# Patient Record
Sex: MALE | Race: BLACK OR AFRICAN AMERICAN | NOT HISPANIC OR LATINO | Employment: OTHER | URBAN - METROPOLITAN AREA
[De-identification: names, ages, dates, MRNs, and addresses within clinical notes are randomized per-mention and may not be internally consistent; named-entity substitution may affect disease eponyms.]

---

## 2020-12-10 ENCOUNTER — HISTORICAL (OUTPATIENT)
Dept: ADMINISTRATIVE | Facility: HOSPITAL | Age: 45
End: 2020-12-10

## 2020-12-10 LAB — SARS-COV-2 RNA RESP QL NAA+PROBE: NOT DETECTED

## 2021-12-11 ENCOUNTER — HISTORICAL (OUTPATIENT)
Dept: ADMINISTRATIVE | Facility: HOSPITAL | Age: 46
End: 2021-12-11

## 2021-12-11 LAB
HAV IGM SERPL QL IA: NONREACTIVE
HBV CORE IGM SERPL QL IA: NONREACTIVE
HBV SURFACE AG SERPL QL IA: NONREACTIVE
HCV AB SERPL QL IA: NONREACTIVE
HIV 1+2 AB+HIV1 P24 AG SERPL QL IA: NONREACTIVE
RPR SER QL: REACTIVE
T PALLIDUM AB SER QL: REACTIVE

## 2022-04-10 ENCOUNTER — HISTORICAL (OUTPATIENT)
Dept: ADMINISTRATIVE | Facility: HOSPITAL | Age: 47
End: 2022-04-10

## 2022-04-27 VITALS
DIASTOLIC BLOOD PRESSURE: 80 MMHG | HEIGHT: 65 IN | WEIGHT: 183 LBS | SYSTOLIC BLOOD PRESSURE: 124 MMHG | BODY MASS INDEX: 30.49 KG/M2 | OXYGEN SATURATION: 95 %

## 2022-05-03 NOTE — HISTORICAL [facility]
This is a historical note converted from [employer]. Formatting and pictures may have been removed.  Please reference [employer] for original formatting and attached multimedia. Chief Complaint  STD testing urine and Blood, exposure to syphillis  History of Present Illness  Patient presents to Urgent Care for stated complaint during Covid health crisis.  46-year-old male presented to clinic requesting STD testing?blood and urine?as requested by the plasma center?before donating, denies any history?of STIs, denies any fever or nausea vomiting or diarrhea?denies any urinary issues or?penile?discharge?or testicular pain  Review of Systems  Constitutional: negative except as stated in HPI  Eye: negative except as stated in HPI  ENT: negative except as stated in HPI  Respiratory: negative except as stated in HPI  Cardiovascular: negative except as stated in HPI  Gastrointestinal: negative except as stated in HPI  Genitourinary: negative except as stated in HPI  Hema/Lymph: negative except as stated in HPI  Endocrine: negative except as stated in HPI  Immunologic: negative except as stated in HPI  Musculoskeletal: negative except as stated in HPI  Integumentary: negative except as stated in HPI  Neurologic: negative except as stated in HPI  Physical Exam  Vitals & Measurements  T:?37.0? ?C (Oral)? HR:?71(Peripheral)? RR:?18? BP:?124/80? SpO2:?95%?  HT:?165.00?cm? WT:?83.000?kg? BMI:?30.49?  Vital signs: Reviewed  General: in no apparent distress, appropriate for age  Head: no signs of head trauma  Eyes: pupils are reactive. ?Extraocular motions intact, conjunctival pink.?  ENT: Bilateral ear canal clear, no edema, no discharge or bleeding. ?Bilateral TMs intact, pearly gray, no effusion, no retraction or perforation or bulging.  Nose: no sinus tenderness, nasal turbinate normal no erythema, clear nasal discharge.  Mouth: posterior pharynx-clear, uvula midline.  Neck :no adenopathy, supple, full range of motion,  nontender  Respiratory: clear breath sounds bilaterally. ?No wheezing  Cardiac :regular, no murmur  Gastrointestinal: Soft, nontender, no CVA tenderness  :?Deferred, please see HPI  Musculoskeletal: Moves all extremities, ambulatory without assistant  Integumentary: No rashes or skin lesions visible  Neurologic: Cranial nerves grossly intact, no signs of peripheral neurological deficit  Assessment/Plan  1.?Encounter for assessment of STD exposure?Z76.89  Discussed physical exam findings, instructed patient?will notify of positive, continue to practice safe sex?use condoms?refrain from any sexual activities?or donate plasma?until all your blood work resulted  Instructed patient to notify his primary care provider regarding the visit today and schedule follow-up appointment in 2 to 3 days if needed  Instructed patient to come back to clinic or go to emergency room department if symptom worsens or no improvement or for any other reasons  Questions elicited and answered  Patient verbalized understanding of discharge instructions, will read discharge education instructions  Ordered:  Bandar Avalos, Trich vag [id number], Now collect, Urine, 12/11/21 11:53:00 CST, Stop date 12/11/21 11:53:00 CST, Nurse collect, Encounter for assessment of STD exposure, 12/11/21 11:53:00 CST  Hepatitis Panel-, Stat collect, 12/11/21 11:52:00 CST, Blood, Stop date 12/11/21 11:53:00 CST, Nurse collect, Encounter for assessment of STD exposure, 12/11/21 11:52:00 CST  HIV 1 and 2, Stat collect, 12/11/21 11:52:00 CST, Blood, Stop date 12/11/21 11:53:00 CST, Nurse collect, Encounter for assessment of STD exposure, 12/11/21 11:52:00 CST  Office/Outpatient Visit Level 4 New [id number] , Encounter for assessment of STD exposure, 12/11/21 12:00:00 CST  Syphilis Antibody (with Reflex RPR), Stat collect, 12/11/21 11:52:00 CST, Blood, Stop date 12/11/21 11:53:00 CST, Nurse collect, Encounter for assessment of STD exposure, 12/11/21 11:52:00  CST  ?   Problem List/Past Medical History  Ongoing  Obesity  Tobacco user  Tobacco user  Historical  No qualifying data  Procedure/Surgical History  Appendectomy;   Medications  No active medications  Allergies  No Known Medication Allergies  Social History  Abuse/Neglect  No, No, Yes, 12/11/2021  No, 06/04/2021  No, 03/03/2021  Alcohol  Current, 1-2 times per year, 02/07/2019  Past, 07/28/2017  Substance Use  Never, 02/07/2019  Never, 07/28/2017  Tobacco  10 or more cigarettes (1/2 pack or more)/day in last 30 days, N/A, 12/11/2021  10 or more cigarettes (1/2 pack or more)/day in last 30 days, No, 06/04/2021  10 or more cigarettes (1/2 pack or more)/day in last 30 days, No, 03/03/2021  Immunizations  Vaccine Date Status   tetanus/diphtheria/pertussis, acel(Tdap) 06/26/2019 Given   tetanus/diphtheria/pertussis, acel(Tdap) 01/29/2017 Given   Health Maintenance  Health Maintenance  ???Pending?(in the next year)  ??? ??Due?  ??? ? ? ?Aspirin Therapy for CVD Prevention due??12/11/21??and every 1??year(s)  ??? ??Refused?  ??? ? ? ?Smoking Cessation due??01/01/21??and every 1??year(s)  ??? ??Due In Future?  ??? ? ? ?Obesity Screening not due until??01/01/22??and every 1??year(s)  ??? ? ? ?Alcohol Misuse Screening not due until??01/02/22??and every 1??year(s)  ???Satisfied?(in the past 1 year)  ??? ??Satisfied?  ??? ? ? ?ADL Screening on??12/11/21.??Satisfied by Marcia Ruff  ??? ? ? ?Alcohol Misuse Screening on??12/11/21.??Satisfied by Marcia Ruff  ??? ? ? ?Blood Pressure Screening on??12/11/21.??Satisfied by Marcia Ruff  ??? ? ? ?Body Mass Index Check on??12/11/21.??Satisfied by Marcia Ruff  ??? ? ? ?Influenza Vaccine on??12/11/21.??Satisfied by Marcia Ruff  ??? ? ? ?Obesity Screening on??12/11/21.??Satisfied by Marcia Ruff  ??? ??Refused?  ??? ? ? ?Smoking Cessation on??12/11/21.??Recorded by Marcia Ruff??Reason: Patient Refuses  ?

## 2022-12-09 ENCOUNTER — HOSPITAL ENCOUNTER (EMERGENCY)
Facility: HOSPITAL | Age: 47
Discharge: HOME | End: 2022-12-09
Attending: FAMILY MEDICINE
Payer: MEDICAID

## 2022-12-09 VITALS
TEMPERATURE: 99 F | RESPIRATION RATE: 18 BRPM | DIASTOLIC BLOOD PRESSURE: 79 MMHG | BODY MASS INDEX: 31.07 KG/M2 | SYSTOLIC BLOOD PRESSURE: 116 MMHG | HEART RATE: 84 BPM | OXYGEN SATURATION: 100 % | WEIGHT: 182 LBS | HEIGHT: 64 IN

## 2022-12-09 DIAGNOSIS — G89.29 CHRONIC RIGHT SHOULDER PAIN: ICD-10-CM

## 2022-12-09 DIAGNOSIS — M25.511 CHRONIC RIGHT SHOULDER PAIN: ICD-10-CM

## 2022-12-09 DIAGNOSIS — E86.0 MILD DEHYDRATION: Primary | ICD-10-CM

## 2022-12-09 LAB
ALBUMIN SERPL-MCNC: 4 GM/DL (ref 3.5–5)
ALBUMIN/GLOB SERPL: 1 RATIO (ref 1.1–2)
ALP SERPL-CCNC: 83 UNIT/L (ref 40–150)
ALT SERPL-CCNC: 22 UNIT/L (ref 0–55)
AMPHET UR QL SCN: NEGATIVE
APPEARANCE UR: CLEAR
AST SERPL-CCNC: 24 UNIT/L (ref 5–34)
BACTERIA #/AREA URNS AUTO: ABNORMAL /HPF
BARBITURATE SCN PRESENT UR: NEGATIVE
BASOPHILS # BLD AUTO: 0.07 X10(3)/MCL (ref 0–0.2)
BASOPHILS NFR BLD AUTO: 0.7 %
BENZODIAZ UR QL SCN: NEGATIVE
BILIRUB UR QL STRIP.AUTO: NEGATIVE MG/DL
BILIRUBIN DIRECT+TOT PNL SERPL-MCNC: 0.7 MG/DL
BUN SERPL-MCNC: 18.9 MG/DL (ref 8.9–20.6)
CALCIUM SERPL-MCNC: 10.5 MG/DL (ref 8.4–10.2)
CANNABINOIDS UR QL SCN: NEGATIVE
CHLORIDE SERPL-SCNC: 104 MMOL/L (ref 98–107)
CK SERPL-CCNC: 564 U/L (ref 30–200)
CO2 SERPL-SCNC: 25 MMOL/L (ref 22–29)
COCAINE UR QL SCN: NEGATIVE
COLOR UR AUTO: YELLOW
CREAT SERPL-MCNC: 1.82 MG/DL (ref 0.73–1.18)
EOSINOPHIL # BLD AUTO: 0.05 X10(3)/MCL (ref 0–0.9)
EOSINOPHIL NFR BLD AUTO: 0.5 %
ERYTHROCYTE [DISTWIDTH] IN BLOOD BY AUTOMATED COUNT: 12.5 % (ref 11.5–17)
FENTANYL UR QL SCN: NEGATIVE
GFR SERPLBLD CREATININE-BSD FMLA CKD-EPI: 46 MLS/MIN/1.73/M2
GLOBULIN SER-MCNC: 4.2 GM/DL (ref 2.4–3.5)
GLUCOSE SERPL-MCNC: 64 MG/DL (ref 74–100)
GLUCOSE UR QL STRIP.AUTO: NORMAL MG/DL
HCT VFR BLD AUTO: 53 % (ref 42–52)
HGB BLD-MCNC: 17.2 GM/DL (ref 14–18)
HYALINE CASTS #/AREA URNS LPF: >20 /LPF
IMM GRANULOCYTES # BLD AUTO: 0.03 X10(3)/MCL (ref 0–0.04)
IMM GRANULOCYTES NFR BLD AUTO: 0.3 %
KETONES UR QL STRIP.AUTO: NEGATIVE MG/DL
LEUKOCYTE ESTERASE UR QL STRIP.AUTO: NEGATIVE UNIT/L
LYMPHOCYTES # BLD AUTO: 1.35 X10(3)/MCL (ref 0.6–4.6)
LYMPHOCYTES NFR BLD AUTO: 14 %
MCH RBC QN AUTO: 30.2 PG (ref 27–31)
MCHC RBC AUTO-ENTMCNC: 32.5 MG/DL (ref 33–36)
MCV RBC AUTO: 93 FL (ref 80–94)
MDMA UR QL SCN: NEGATIVE
MONOCYTES # BLD AUTO: 0.85 X10(3)/MCL (ref 0.1–1.3)
MONOCYTES NFR BLD AUTO: 8.8 %
MUCOUS THREADS URNS QL MICRO: ABNORMAL /LPF
NEUTROPHILS # BLD AUTO: 7.3 X10(3)/MCL (ref 2.1–9.2)
NEUTROPHILS NFR BLD AUTO: 75.7 %
NITRITE UR QL STRIP.AUTO: NEGATIVE
NRBC BLD AUTO-RTO: 0 %
OPIATES UR QL SCN: NEGATIVE
PCP UR QL: NEGATIVE
PH UR STRIP.AUTO: 5 [PH]
PH UR: 5 [PH] (ref 3–11)
PLATELET # BLD AUTO: 273 X10(3)/MCL (ref 130–400)
PLATELETS.RETICULATED NFR BLD AUTO: 2.4 % (ref 0.9–11.2)
PMV BLD AUTO: 9.7 FL (ref 7.4–10.4)
POTASSIUM SERPL-SCNC: 4 MMOL/L (ref 3.5–5.1)
PROT SERPL-MCNC: 8.2 GM/DL (ref 6.4–8.3)
PROT UR QL STRIP.AUTO: ABNORMAL MG/DL
RBC # BLD AUTO: 5.7 X10(6)/MCL (ref 4.7–6.1)
RBC #/AREA URNS AUTO: ABNORMAL /HPF
RBC UR QL AUTO: NEGATIVE UNIT/L
SODIUM SERPL-SCNC: 141 MMOL/L (ref 136–145)
SP GR UR STRIP.AUTO: 1.03
SPECIFIC GRAVITY, URINE AUTO (.000) (OHS): 1.03 (ref 1–1.03)
SQUAMOUS #/AREA URNS LPF: ABNORMAL /HPF
UROBILINOGEN UR STRIP-ACNC: ABNORMAL MG/DL
WBC # SPEC AUTO: 9.7 X10(3)/MCL (ref 4.5–11.5)
WBC #/AREA URNS AUTO: ABNORMAL /HPF

## 2022-12-09 PROCEDURE — 96360 HYDRATION IV INFUSION INIT: CPT

## 2022-12-09 PROCEDURE — 25000003 PHARM REV CODE 250: Performed by: NURSE PRACTITIONER

## 2022-12-09 PROCEDURE — 82550 ASSAY OF CK (CPK): CPT | Performed by: NURSE PRACTITIONER

## 2022-12-09 PROCEDURE — 96361 HYDRATE IV INFUSION ADD-ON: CPT

## 2022-12-09 PROCEDURE — 80307 DRUG TEST PRSMV CHEM ANLYZR: CPT | Performed by: NURSE PRACTITIONER

## 2022-12-09 PROCEDURE — 36415 COLL VENOUS BLD VENIPUNCTURE: CPT | Performed by: NURSE PRACTITIONER

## 2022-12-09 PROCEDURE — 99284 EMERGENCY DEPT VISIT MOD MDM: CPT | Mod: 25

## 2022-12-09 PROCEDURE — 85025 COMPLETE CBC W/AUTO DIFF WBC: CPT | Performed by: NURSE PRACTITIONER

## 2022-12-09 PROCEDURE — 80053 COMPREHEN METABOLIC PANEL: CPT | Performed by: NURSE PRACTITIONER

## 2022-12-09 PROCEDURE — 81001 URINALYSIS AUTO W/SCOPE: CPT | Performed by: NURSE PRACTITIONER

## 2022-12-09 RX ADMIN — SODIUM CHLORIDE 1000 ML: 9 INJECTION, SOLUTION INTRAVENOUS at 04:12

## 2022-12-09 RX ADMIN — SODIUM CHLORIDE 1000 ML: 9 INJECTION, SOLUTION INTRAVENOUS at 05:12

## 2022-12-09 NOTE — ED NOTES
ASSUME CARE PT TO ER BED 2 , NO DISTRESS AT ALL ,SKIN PWD ,RESP FULL, CO OF RT SHOULDER PAIN SINCE September , CMS INTACT , STRONG RADIAL PULSE , ALSO STATES THINKS DEHYDRATED BECAUSE MUSCLE CRAMPS TODAY AT WORK .

## 2022-12-09 NOTE — ED PROVIDER NOTES
Encounter Date: 12/9/2022       History     Chief Complaint   Patient presents with    Weakness     Right shoulder pain after swinging sledgehammer in September, pt reports he got overheated today, gen weakness. Ambulatory. NAD     The patient presents with weakness today after working outside. He thinks he got overheated.  The onset was this am.  The course/duration of symptoms is constant but varying in intensity.  The character of symptoms is generalized, lack of stamina, lack of strength, and tired.  The degree at present is moderate.  Risk factors consist of none.  Prior episodes: occasional.  Therapy today: none.  Associated symptoms: muscle spasms, denies chest pain, denies shortness of breath, denies cough, denies fever. He also reports right shoulder pain for 2-3 months after using a sledgehammer.         Review of patient's allergies indicates:  No Known Allergies  History reviewed. No pertinent past medical history.  History reviewed. No pertinent surgical history.  History reviewed. No pertinent family history.     Review of Systems   Constitutional:  Negative for fever.   HENT:  Negative for sore throat.    Respiratory:  Negative for shortness of breath.    Cardiovascular:  Negative for chest pain.   Gastrointestinal:  Negative for nausea.   Genitourinary:  Negative for dysuria.   Musculoskeletal:  Positive for arthralgias and myalgias. Negative for back pain.   Skin:  Negative for rash.   Neurological:  Positive for weakness.   Hematological:  Does not bruise/bleed easily.   All other systems reviewed and are negative.    Physical Exam     Initial Vitals [12/09/22 1508]   BP Pulse Resp Temp SpO2   116/79 84 18 98.6 °F (37 °C) 100 %      MAP       --         Physical Exam    Nursing note and vitals reviewed.  Constitutional: He appears well-developed and well-nourished.   HENT:   Head: Normocephalic and atraumatic.   Neck: Neck supple.   Normal range of motion.  Cardiovascular:  Normal rate, regular  rhythm, normal heart sounds and intact distal pulses.           Pulmonary/Chest: Breath sounds normal.   Abdominal: Abdomen is soft. Bowel sounds are normal.   Musculoskeletal:         General: Normal range of motion.      Cervical back: Normal range of motion and neck supple.      Comments: Mild ttp right shoulder, FROM, good distal pulses, NVI     Neurological: He is alert and oriented to person, place, and time. He has normal strength.   Skin: Skin is warm and dry.   Psychiatric: He has a normal mood and affect.       ED Course   Procedures  Labs Reviewed   COMPREHENSIVE METABOLIC PANEL - Abnormal; Notable for the following components:       Result Value    Glucose Level 64 (*)     Creatinine 1.82 (*)     Calcium Level Total 10.5 (*)     Globulin 4.2 (*)     Albumin/Globulin Ratio 1.0 (*)     All other components within normal limits   URINALYSIS, REFLEX TO URINE CULTURE - Abnormal; Notable for the following components:    Protein, UA Trace (*)     Urobilinogen, UA 1+ (*)     Mucous, UA Trace (*)     Hyaline Casts, UA >20 (*)     All other components within normal limits   CK - Abnormal; Notable for the following components:    Creatine Kinase 564 (*)     All other components within normal limits   CBC WITH DIFFERENTIAL - Abnormal; Notable for the following components:    Hct 53.0 (*)     MCHC 32.5 (*)     All other components within normal limits   DRUG SCREEN, URINE (BEAKER) - Normal    Narrative:     Cut off concentrations:    Amphetamines - 1000 ng/ml  Barbiturates - 200 ng/ml  Benzodiazepine - 200 ng/ml  Cannabinoids (THC) - 50 ng/ml  Cocaine - 300 ng/ml  Fentanyl - 1.0 ng/ml  MDMA - 500 ng/ml  Opiates - 300 ng/ml   Phencyclidine (PCP) - 25 ng/ml    Specimen submitted for drug analysis and tested for pH and specific gravity in order to evaluate sample integrity. Suspect tampering if specific gravity is <1.003 and/or pH is not within the range of 4.5 - 8.0  False negatives may result form substances such as  bleach added to urine.  False positives may result for the presence of a substance with similar chemical structure to the drug or its metabolite.    This test provides only a PRELIMINARY analytical test result. A more specific alternate chemical method must be used in order to obtain a confirmed analytical result. Gas chromatography/mass spectrometry (GC/MS) is the preferred confirmatory method. Other chemical confirmation methods are available. Clinical consideration and professional judgement should be applied to any drug of abuse test result, particularly when preliminary positive results are used.    Positive results will be confirmed only at the physicians request. Unconfirmed screening results are to be used only for medical purposes (treatment).        CBC W/ AUTO DIFFERENTIAL    Narrative:     The following orders were created for panel order CBC auto differential.  Procedure                               Abnormality         Status                     ---------                               -----------         ------                     CBC with Differential[[id number]]        Abnormal            Final result                 Please view results for these tests on the individual orders.   EXTRA TUBES    Narrative:     The following orders were created for panel order EXTRA TUBES.  Procedure                               Abnormality         Status                     ---------                               -----------         ------                     Light Blue Top Hold[[id number]]                              In process                 Red Top Hold[[id number]]                                     In process                 Lavender Top Hold[[id number]]                                In process                   Please view results for these tests on the individual orders.   LIGHT BLUE TOP HOLD   RED TOP HOLD   LAVENDER TOP HOLD          Imaging Results              X-ray Shoulder 2 or More Views Right (Final  result)  Result time 12/09/22 16:40:24      Final result by Albert Ann MD (12/09/22 16:40:24)                   Impression:      No acute findings.      Electronically signed by: Albert Ann  Date:    12/09/2022  Time:    16:40               Narrative:    EXAMINATION:  XR SHOULDER COMPLETE 2 OR MORE VIEWS RIGHT    CLINICAL HISTORY:  right shoulder pain;    COMPARISON:  1 April 2018    FINDINGS:  Three views of the right shoulder.  There is no fracture or dislocation.  There are mild degenerative changes.                                       Medications   sodium chloride 0.9% bolus 1,000 mL (1,000 mLs Intravenous New Bag 12/9/22 3061)   sodium chloride 0.9% bolus 1,000 mL (0 mLs Intravenous Stopped 12/9/22 1719)     Medical Decision Making:   History:   Old Records Summarized: records from clinic visits and records from previous admission(s).  Clinical Tests:   Lab Tests: Ordered and Reviewed  Radiological Study: Ordered and Reviewed  Re-eval at 1805: feels better after ivf and wishes to go home, advised to drink plenty fluids especially when working in the heat, will take tylenol for pain if needed, will refer to medicine clinic per request for a pcp, strict return to ER instructions given    6:18 PM DISPOSITION: The patient is resting comfortably in no acute distress.  He is hemodynamically stable and is without objective evidence for acute process requiring urgent intervention or hospitalization. I provided counseling to patient with regard to condition, the treatment plan, specific conditions for return, and the importance of follow up. Detailed written and verbal instructions provided to patient and he expressed a verbal understanding of the discharge instructions and overall management plan. Reiterated the importance of medication administration and safety and advised patient to follow up with primary care provider in 3-5 days or sooner if needed.  Answered questions at this time. The patient is stable  for discharge.                           Clinical Impression:   Final diagnoses:  [E86.0] Mild dehydration (Primary)  [M25.511, G89.29] Chronic right shoulder pain      ED Disposition Condition    Discharge Stable          ED Prescriptions    None       Follow-up Information       Follow up With Specialties Details Why Contact Info    urgent referral sent to medicine clinic per request for a pcp        [facility] Emergency Medicine  If symptoms worsen [address] [facility] [id number] [phone]             BALA Spicer  12/09/22 1880

## 2022-12-29 ENCOUNTER — OFFICE VISIT (OUTPATIENT)
Dept: INTERNAL MEDICINE | Facility: CLINIC | Age: 47
End: 2022-12-29
Payer: MEDICAID

## 2022-12-29 VITALS
SYSTOLIC BLOOD PRESSURE: 126 MMHG | DIASTOLIC BLOOD PRESSURE: 78 MMHG | RESPIRATION RATE: 20 BRPM | WEIGHT: 182.38 LBS | TEMPERATURE: 98 F | BODY MASS INDEX: 31.14 KG/M2 | HEIGHT: 64 IN | HEART RATE: 71 BPM

## 2022-12-29 DIAGNOSIS — N17.9 AKI (ACUTE KIDNEY INJURY): ICD-10-CM

## 2022-12-29 DIAGNOSIS — Z11.9 SCREENING EXAMINATION FOR INFECTIOUS DISEASE: ICD-10-CM

## 2022-12-29 DIAGNOSIS — Z00.00 WELLNESS EXAMINATION: Primary | ICD-10-CM

## 2022-12-29 DIAGNOSIS — G89.29 CHRONIC RIGHT SHOULDER PAIN: ICD-10-CM

## 2022-12-29 DIAGNOSIS — E86.0 MILD DEHYDRATION: ICD-10-CM

## 2022-12-29 DIAGNOSIS — S46.001S ROTATOR CUFF INJURY, RIGHT, SEQUELA: ICD-10-CM

## 2022-12-29 DIAGNOSIS — Z12.11 COLON CANCER SCREENING: ICD-10-CM

## 2022-12-29 DIAGNOSIS — M25.511 CHRONIC RIGHT SHOULDER PAIN: ICD-10-CM

## 2022-12-29 PROCEDURE — 99212 OFFICE O/P EST SF 10 MIN: CPT | Mod: 25,S$PBB,, | Performed by: NURSE PRACTITIONER

## 2022-12-29 PROCEDURE — 99215 OFFICE O/P EST HI 40 MIN: CPT | Mod: PBBFAC | Performed by: NURSE PRACTITIONER

## 2022-12-29 PROCEDURE — 99212 PR OFFICE/OUTPT VISIT, EST, LEVL II, 10-19 MIN: ICD-10-PCS | Mod: 25,S$PBB,, | Performed by: NURSE PRACTITIONER

## 2022-12-29 RX ORDER — ASPIRIN/CAFFEINE 500-32.5MG
TABLET ORAL
COMMUNITY

## 2022-12-29 NOTE — ASSESSMENT & PLAN NOTE
He reports using sledge hammer on his job in September 2022 which is when pain started. He denies pain prior to this. He has limited ROM and OTC pain medication ineffective in controlling his pain. He currently does not have any insurance and will visit financial assistance. Would recommend PT and needs referral to [facility] for further evaluation of rotator cuff injury with further imaging if no improvement  Will repeat renal functions as he was recently seen in ER for dehydration with elevated renal functions and if renal functions wnl will prescribe NSAID for pain relief

## 2022-12-29 NOTE — PROGRESS NOTES
Shanell L Cesario, NP   [facility]  [address] [facility] [address]      PATIENT NAME: Kobe Sidhu  : 1975  DATE: 22  MRN: [id number]      Billing Provider: Shanell Nassar NP  Level of Service: HI PREVENTIVE VISIT,NEW,40-64  Patient PCP Information       Provider PCP Type    Shanell Nassar NP General            Reason for Visit / Chief Complaint: Establish Care (Right shoulder discomfort-onset 3 months ago. Taking Laisha back & body without relief )       History of Present Illness / Problem Focused Workflow     Kobe Sidhu presents to the clinic with Establish Care (Right shoulder discomfort-onset 3 months ago. Taking Laisha back & body without relief )     48 yo AAM to establish PCP care. Denies prior pcp care or pmh. Reports right shoulder pain ongoing since 2022. He mentions pain started after using sledge hammer at work. Prior to this, denies pain. He has limited mobility and OTC medications no longer effective in controlling his pain. He had recent imaging completed with ER visit and was seen for mild dehydration. He admits he was not drinking enough water at this time. He does not have any insurance but will visit financial assistance today after visit. He is not fasting today but will return for labs. Denies any fever, chills, HA, dizziness, sore throat, cough, CP, SOB, LAD, abdominal pain, n/v/d, bloody stools. No other concerns stated.      Review of Systems     Review of Systems   Constitutional:  Negative for appetite change, chills, fatigue, fever and unexpected weight change.   HENT:  Negative for congestion, ear pain, hearing loss, sinus pressure, sore throat and tinnitus.    Respiratory:  Negative for cough, chest tightness, shortness of breath and wheezing.    Cardiovascular:  Negative for chest pain, palpitations and leg swelling.   Gastrointestinal:  Negative for abdominal distention, abdominal pain,  blood in stool, constipation, diarrhea, nausea and vomiting.   Genitourinary:  Negative for dysuria and hematuria.   Musculoskeletal:  Positive for arthralgias (right shoulder pain). Negative for myalgias.   Skin:  Negative for pallor.   Allergic/Immunologic: Negative for environmental allergies.   Neurological:  Negative for dizziness, syncope, weakness, numbness and headaches.   Hematological:  Negative for adenopathy. Does not bruise/bleed easily.   Psychiatric/Behavioral:  Negative for agitation, behavioral problems, confusion, hallucinations, sleep disturbance and suicidal ideas. The patient is not nervous/anxious.      Medical / Social / Family History   History reviewed. No pertinent past medical history.    Past Surgical History:   Procedure Laterality Date    APPENDECTOMY  1984       Social History  Mr. Bullock  reports that he quit smoking about 1 years ago. His smoking use included cigarettes. He has a 0.38 pack-year smoking history. He has never used smokeless tobacco. He reports current alcohol use of about 2.0 standard drinks per week. He reports that he does not use drugs.    Family History  Mr. Bullock's family history includes Cancer in his maternal grandmother and mother.    Medications and Allergies     Medications  Medication List with Changes/Refills   Current Medications    ASPIRIN-CAFFEINE (DESIRAE BACK AND BODY) 500-32.5 MG TAB    Take by mouth.       Allergies  Review of patient's allergies indicates:  No Known Allergies    Physical Examination     Vitals:    12/29/22 0816   BP: 126/78   Pulse: 71   Resp: 20   Temp: 98.2 °F (36.8 °C)     Physical Exam  Vitals and nursing note reviewed.   Constitutional:       Appearance: Normal appearance. He is not ill-appearing.   HENT:      Head: Normocephalic.      Right Ear: There is impacted cerumen.      Left Ear: There is impacted cerumen.      Nose: Nose normal.      Mouth/Throat:      Mouth: Mucous membranes are moist.   Eyes:      Extraocular Movements:  Extraocular movements intact.      Conjunctiva/sclera: Conjunctivae normal.      Pupils: Pupils are equal, round, and reactive to light.   Cardiovascular:      Rate and Rhythm: Normal rate and regular rhythm.      Pulses: Normal pulses.   Pulmonary:      Effort: Pulmonary effort is normal. No respiratory distress.      Breath sounds: Normal breath sounds.   Abdominal:      General: Bowel sounds are normal. There is no distension.      Palpations: Abdomen is soft. There is no mass.      Tenderness: There is no abdominal tenderness.      Hernia: No hernia is present.   Musculoskeletal:      Right shoulder: Tenderness present. Decreased range of motion.      Cervical back: Normal range of motion and neck supple.      Right lower leg: No edema.      Left lower leg: No edema.   Skin:     General: Skin is warm and dry.      Capillary Refill: Capillary refill takes less than 2 seconds.   Neurological:      Mental Status: He is alert and oriented to person, place, and time. Mental status is at baseline.      Motor: No weakness.   Psychiatric:         Mood and Affect: Mood normal.         Behavior: Behavior normal.         Thought Content: Thought content normal.         Judgment: Judgment normal.         Results     Lab Results   Component Value Date    WBC 9.7 12/09/2022    RBC 5.70 12/09/2022    HGB 17.2 12/09/2022    HCT 53.0 (H) 12/09/2022    MCV 93.0 12/09/2022    MCH 30.2 12/09/2022    MCHC 32.5 (L) 12/09/2022    RDW 12.5 12/09/2022     12/09/2022    MPV 9.7 12/09/2022     CMP  Sodium Level   Date Value Ref Range Status   12/09/2022 141 136 - 145 mmol/L Final     Potassium Level   Date Value Ref Range Status   12/09/2022 4.0 3.5 - 5.1 mmol/L Final     Carbon Dioxide   Date Value Ref Range Status   12/09/2022 25 22 - 29 mmol/L Final     Blood Urea Nitrogen   Date Value Ref Range Status   12/09/2022 18.9 8.9 - 20.6 mg/dL Final     Creatinine   Date Value Ref Range Status   12/09/2022 1.82 (H) 0.73 - 1.18 mg/dL  Final     Calcium Level Total   Date Value Ref Range Status   12/09/2022 10.5 (H) 8.4 - 10.2 mg/dL Final     Albumin Level   Date Value Ref Range Status   12/09/2022 4.0 3.5 - 5.0 gm/dL Final     Bilirubin Total   Date Value Ref Range Status   12/09/2022 0.7 <=1.5 mg/dL Final     Alkaline Phosphatase   Date Value Ref Range Status   12/09/2022 83 40 - 150 unit/L Final     Aspartate Aminotransferase   Date Value Ref Range Status   12/09/2022 24 5 - 34 unit/L Final     Alanine Aminotransferase   Date Value Ref Range Status   12/09/2022 22 0 - 55 unit/L Final     Estimated GFR-Non    Date Value Ref Range Status   06/26/2020 64 (L) >>=90 mL/min Final     No results found for: CHOL  No results found for: HDL  No results found for: LDLCALC  No results found for: TRIG  No results found for: CHOLHDL  No results found for: TSH  Lab Results   Component Value Date    PHUR 5.5 06/26/2020    PROTEINUA Trace (A) 12/09/2022    GLUCUA Negative 06/26/2020    KETONESU Trace (A) 06/26/2020    OCCULTUA Negative 06/26/2020    NITRITE Negative 06/26/2020    LEUKOCYTESUR Negative 12/09/2022           Assessment and Plan (including Health Maintenance)     Plan:         Health Maintenance Due   Topic Date Due    Lipid Panel  Never done    COVID-19 Vaccine (1) Never done    Colorectal Cancer Screening  Never done       Problem List Items Addressed This Visit          Renal/    ANGE (acute kidney injury)    Current Assessment & Plan     ER visit 12/15/22 with diagnosis of mild dehydration and elevated renal functions and CPK levels  Encouraged adequate hydration at least 6-8 glasses of water/d especially during warmer months  Rechek labs today             Other    Rotator cuff injury, right, sequela    Current Assessment & Plan     He reports using sledge hammer on his job in September 2022 which is when pain started. He denies pain prior to this. He has limited ROM and OTC pain medication ineffective in controlling his pain.  He currently does not have any insurance and will visit financial assistance. Would recommend PT and needs referral to [facility] for further evaluation of rotator cuff injury with further imaging if no improvement  Will repeat renal functions as he was recently seen in ER for dehydration with elevated renal functions and if renal functions wnl will prescribe NSAID for pain relief         Relevant Orders    Ambulatory referral/consult to Orthopedics     Other Visit Diagnoses       Wellness examination    -  Primary  Avoid tobacco/alcohol/ drugs  Regular exercise as tolerated  Healthy lifestyle habits  Wellness labs within 1 week, patient not fasting today  No vaccines- patient self pay     Mild dehydration        Chronic right shoulder pain        Colon cancer screening        Relevant Orders    OCCULT BLOOD FECAL IMMUNOASSAY    Screening examination for infectious disease                Health Maintenance Topics with due status: Not Due       Topic Last Completion Date    TETANUS VACCINE 06/26/2019       Future Appointments   Date Time Provider Department Center   1/9/2023  1:30 PM Shanell Nassar NP [facility]      Follow up in 1 week virtual audio to review labs.       Signature:  Shanell Nassar NP  [facility]  [address] [facility] [address]    Date of encounter: 12/29/22

## 2022-12-29 NOTE — ASSESSMENT & PLAN NOTE
ER visit 12/15/22 with diagnosis of mild dehydration and elevated renal functions and CPK levels  Encouraged adequate hydration at least 6-8 glasses of water/d especially during warmer months  Rechek labs today

## 2023-01-11 ENCOUNTER — LAB VISIT (OUTPATIENT)
Dept: LAB | Facility: HOSPITAL | Age: 48
End: 2023-01-11
Attending: NURSE PRACTITIONER
Payer: MEDICAID

## 2023-01-11 DIAGNOSIS — Z11.9 SCREENING EXAMINATION FOR INFECTIOUS DISEASE: ICD-10-CM

## 2023-01-11 DIAGNOSIS — Z00.00 WELLNESS EXAMINATION: ICD-10-CM

## 2023-01-11 DIAGNOSIS — N17.9 AKI (ACUTE KIDNEY INJURY): ICD-10-CM

## 2023-01-11 LAB
ALBUMIN SERPL-MCNC: 4 G/DL (ref 3.5–5)
ALBUMIN/GLOB SERPL: 1 RATIO (ref 1.1–2)
ALP SERPL-CCNC: 80 UNIT/L (ref 40–150)
ALT SERPL-CCNC: 21 UNIT/L (ref 0–55)
AST SERPL-CCNC: 24 UNIT/L (ref 5–34)
BASOPHILS # BLD AUTO: 0.08 X10(3)/MCL (ref 0–0.2)
BASOPHILS NFR BLD AUTO: 1.2 %
BILIRUBIN DIRECT+TOT PNL SERPL-MCNC: 0.7 MG/DL
BUN SERPL-MCNC: 13.8 MG/DL (ref 8.9–20.6)
CALCIUM SERPL-MCNC: 10.2 MG/DL (ref 8.4–10.2)
CHLORIDE SERPL-SCNC: 106 MMOL/L (ref 98–107)
CHOLEST SERPL-MCNC: 261 MG/DL
CHOLEST/HDLC SERPL: 4 {RATIO} (ref 0–5)
CO2 SERPL-SCNC: 27 MMOL/L (ref 22–29)
CREAT SERPL-MCNC: 1.24 MG/DL (ref 0.73–1.18)
EOSINOPHIL # BLD AUTO: 0.32 X10(3)/MCL (ref 0–0.9)
EOSINOPHIL NFR BLD AUTO: 4.6 %
ERYTHROCYTE [DISTWIDTH] IN BLOOD BY AUTOMATED COUNT: 12.5 % (ref 11.5–17)
EST. AVERAGE GLUCOSE BLD GHB EST-MCNC: 116.9 MG/DL
GFR SERPLBLD CREATININE-BSD FMLA CKD-EPI: >60 MLS/MIN/1.73/M2
GLOBULIN SER-MCNC: 4.1 GM/DL (ref 2.4–3.5)
GLUCOSE SERPL-MCNC: 92 MG/DL (ref 74–100)
HAV IGM SERPL QL IA: NONREACTIVE
HBA1C MFR BLD: 5.7 %
HBV CORE IGM SERPL QL IA: NONREACTIVE
HBV SURFACE AG SERPL QL IA: NONREACTIVE
HCT VFR BLD AUTO: 49.2 % (ref 42–52)
HCV AB SERPL QL IA: NONREACTIVE
HDLC SERPL-MCNC: 60 MG/DL (ref 35–60)
HGB BLD-MCNC: 16.2 GM/DL (ref 14–18)
HIV 1+2 AB+HIV1 P24 AG SERPL QL IA: NONREACTIVE
IMM GRANULOCYTES # BLD AUTO: 0.02 X10(3)/MCL (ref 0–0.04)
IMM GRANULOCYTES NFR BLD AUTO: 0.3 %
LDLC SERPL CALC-MCNC: 176 MG/DL (ref 50–140)
LYMPHOCYTES # BLD AUTO: 2.59 X10(3)/MCL (ref 0.6–4.6)
LYMPHOCYTES NFR BLD AUTO: 37.3 %
MCH RBC QN AUTO: 30.2 PG
MCHC RBC AUTO-ENTMCNC: 32.9 MG/DL (ref 33–36)
MCV RBC AUTO: 91.6 FL (ref 80–94)
MONOCYTES # BLD AUTO: 0.48 X10(3)/MCL (ref 0.1–1.3)
MONOCYTES NFR BLD AUTO: 6.9 %
NEUTROPHILS # BLD AUTO: 3.45 X10(3)/MCL (ref 2.1–9.2)
NEUTROPHILS NFR BLD AUTO: 49.7 %
NRBC BLD AUTO-RTO: 0 %
PLATELET # BLD AUTO: 272 X10(3)/MCL (ref 130–400)
PMV BLD AUTO: 9.9 FL (ref 7.4–10.4)
POTASSIUM SERPL-SCNC: 4.6 MMOL/L (ref 3.5–5.1)
PROT SERPL-MCNC: 8.1 GM/DL (ref 6.4–8.3)
RBC # BLD AUTO: 5.37 X10(6)/MCL (ref 4.7–6.1)
SODIUM SERPL-SCNC: 143 MMOL/L (ref 136–145)
T PALLIDUM AB SER QL: REACTIVE
TRIGL SERPL-MCNC: 125 MG/DL (ref 34–140)
TSH SERPL-ACNC: 1.09 UIU/ML (ref 0.35–4.94)
VLDLC SERPL CALC-MCNC: 25 MG/DL
WBC # SPEC AUTO: 6.9 X10(3)/MCL (ref 4.5–11.5)

## 2023-01-11 PROCEDURE — 80061 LIPID PANEL: CPT

## 2023-01-11 PROCEDURE — 87389 HIV-1 AG W/HIV-1&-2 AB AG IA: CPT

## 2023-01-11 PROCEDURE — 80053 COMPREHEN METABOLIC PANEL: CPT

## 2023-01-11 PROCEDURE — 85025 COMPLETE CBC W/AUTO DIFF WBC: CPT

## 2023-01-11 PROCEDURE — 86780 TREPONEMA PALLIDUM: CPT

## 2023-01-11 PROCEDURE — 84443 ASSAY THYROID STIM HORMONE: CPT

## 2023-01-11 PROCEDURE — 86592 SYPHILIS TEST NON-TREP QUAL: CPT

## 2023-01-11 PROCEDURE — 83036 HEMOGLOBIN GLYCOSYLATED A1C: CPT

## 2023-01-11 PROCEDURE — 36415 COLL VENOUS BLD VENIPUNCTURE: CPT

## 2023-01-11 PROCEDURE — 80074 ACUTE HEPATITIS PANEL: CPT

## 2023-01-12 LAB — PATH REV: NORMAL

## 2023-01-13 LAB
RPR SER QL: REACTIVE
RPR SER-TITR: ABNORMAL {TITER}

## 2023-01-17 ENCOUNTER — TELEPHONE (OUTPATIENT)
Dept: INTERNAL MEDICINE | Facility: CLINIC | Age: 48
End: 2023-01-17
Payer: MEDICAID

## 2023-01-17 NOTE — TELEPHONE ENCOUNTER
----- Message from KERA Diaz sent at 1/15/2023  1:20 PM CST -----  This is Shanell's pt and he will need an apt to address abnormal results. His syphilis titer is elevated. Please schedule next available. Has he been treated for syphilis before, if so when and with pills or shots?  Has he been treated or diagnosed with diabetes before? Appears he is prediabetes and we need to discuss diet and prevention.   thanks  ----- Message -----  From: Background User Lab  Sent: 1/11/2023   3:14 PM CST  To: Shanell Nassar NP

## 2023-01-17 NOTE — TELEPHONE ENCOUNTER
Spoke with patient informed & he reports he was treated for Syphilis with one shot late 90's early 2000's but he was never informed he was prediabetic. Informed patient we will contact him with an appointment. He verbalize understanding.

## 2023-01-24 ENCOUNTER — OFFICE VISIT (OUTPATIENT)
Dept: INTERNAL MEDICINE | Facility: CLINIC | Age: 48
End: 2023-01-24
Payer: MEDICAID

## 2023-01-24 VITALS
SYSTOLIC BLOOD PRESSURE: 113 MMHG | DIASTOLIC BLOOD PRESSURE: 72 MMHG | WEIGHT: 182 LBS | RESPIRATION RATE: 16 BRPM | HEIGHT: 64 IN | TEMPERATURE: 97 F | HEART RATE: 59 BPM | BODY MASS INDEX: 31.07 KG/M2

## 2023-01-24 DIAGNOSIS — E78.2 MIXED HYPERLIPIDEMIA: ICD-10-CM

## 2023-01-24 DIAGNOSIS — A53.9 SYPHILIS: Primary | ICD-10-CM

## 2023-01-24 DIAGNOSIS — Z12.11 COLON CANCER SCREENING: ICD-10-CM

## 2023-01-24 DIAGNOSIS — Z00.00 WELLNESS EXAMINATION: ICD-10-CM

## 2023-01-24 DIAGNOSIS — R73.03 PREDIABETES: ICD-10-CM

## 2023-01-24 DIAGNOSIS — Z12.5 PROSTATE CANCER SCREENING: ICD-10-CM

## 2023-01-24 PROCEDURE — 99214 OFFICE O/P EST MOD 30 MIN: CPT | Mod: PBBFAC | Performed by: NURSE PRACTITIONER

## 2023-01-24 PROCEDURE — 3008F BODY MASS INDEX DOCD: CPT | Mod: CPTII,,, | Performed by: NURSE PRACTITIONER

## 2023-01-24 PROCEDURE — 1160F RVW MEDS BY RX/DR IN RCRD: CPT | Mod: CPTII,,, | Performed by: NURSE PRACTITIONER

## 2023-01-24 PROCEDURE — 3008F PR BODY MASS INDEX (BMI) DOCUMENTED: ICD-10-PCS | Mod: CPTII,,, | Performed by: NURSE PRACTITIONER

## 2023-01-24 PROCEDURE — 1160F PR REVIEW ALL MEDS BY PRESCRIBER/CLIN PHARMACIST DOCUMENTED: ICD-10-PCS | Mod: CPTII,,, | Performed by: NURSE PRACTITIONER

## 2023-01-24 PROCEDURE — 3078F DIAST BP <80 MM HG: CPT | Mod: CPTII,,, | Performed by: NURSE PRACTITIONER

## 2023-01-24 PROCEDURE — 99214 PR OFFICE/OUTPT VISIT, EST, LEVL IV, 30-39 MIN: ICD-10-PCS | Mod: S$PBB,,, | Performed by: NURSE PRACTITIONER

## 2023-01-24 PROCEDURE — 1159F PR MEDICATION LIST DOCUMENTED IN MEDICAL RECORD: ICD-10-PCS | Mod: CPTII,,, | Performed by: NURSE PRACTITIONER

## 2023-01-24 PROCEDURE — 99214 OFFICE O/P EST MOD 30 MIN: CPT | Mod: S$PBB,,, | Performed by: NURSE PRACTITIONER

## 2023-01-24 PROCEDURE — 3078F PR MOST RECENT DIASTOLIC BLOOD PRESSURE < 80 MM HG: ICD-10-PCS | Mod: CPTII,,, | Performed by: NURSE PRACTITIONER

## 2023-01-24 PROCEDURE — 3074F PR MOST RECENT SYSTOLIC BLOOD PRESSURE < 130 MM HG: ICD-10-PCS | Mod: CPTII,,, | Performed by: NURSE PRACTITIONER

## 2023-01-24 PROCEDURE — 1159F MED LIST DOCD IN RCRD: CPT | Mod: CPTII,,, | Performed by: NURSE PRACTITIONER

## 2023-01-24 PROCEDURE — 96372 THER/PROPH/DIAG INJ SC/IM: CPT | Mod: PBBFAC

## 2023-01-24 PROCEDURE — 3074F SYST BP LT 130 MM HG: CPT | Mod: CPTII,,, | Performed by: NURSE PRACTITIONER

## 2023-01-24 RX ORDER — ATORVASTATIN CALCIUM 20 MG/1
20 TABLET, FILM COATED ORAL DAILY
Qty: 30 TABLET | Refills: 3 | Status: SHIPPED | OUTPATIENT
Start: 2023-01-24 | End: 2023-05-24

## 2023-01-24 RX ADMIN — PENICILLIN G BENZATHINE 2.4 MILLION UNITS: 1200000 INJECTION, SUSPENSION INTRAMUSCULAR at 09:01

## 2023-01-24 NOTE — ASSESSMENT & PLAN NOTE
RX Atorvastatin 20 mg q hs  3 month f/u via audio virtual   Follow a low cholesterol, low saturated fat diet with less than 200 mg of cholesterol a day.   Avoid fried foods and high saturated fats (cohen, sausage, cookies, cakes, chips, cheese, whole milk, butter, mayonnaise, creamy dressings, gravy, stew, gumbo, boudin, cracklins and cream sauces).  Add flax seed or fish oil supplements to diet.   Increase dietary fiber.   Regular exercise improves cholesterol levels.  Physical activity 5 times a week for 30 minutes per day (or 150 minutes per week).   Stressed importance of dietary modifications.

## 2023-01-24 NOTE — PROGRESS NOTES
Rissa Leal, KERA   [facility]  [address] [facility] [address]      PATIENT NAME: Kobe Sidhu  : 1975  DATE: 23  MRN: [id number]      Reason for Visit / Chief Complaint: Follow-up (Lab review / )       History of Present Illness / Problem Focused Workflow     Kobe Sidhu presents to the clinic with Follow-up (Lab review / )     48 yo AAM here today to f/u for lab review. PMH prediabetes, HLD, hx syphillis (treated).     Prostate Cancer Screening - PSA  Colon Cancer Screening -  Cologuard  Osteoporosis Screening - Vitamin D level  STI Screening - Negative      23  Pt here today for lab review evaluation, NP Cesario patient who will switch to me now due to f/u needed. Labs reviewed, pt with positive syphilis AB, was positive back in  but no treatment givne, is agreeable to Bicillin IM x 1 today then repeat x 2 more doses in clinic. Discussed prediabetes status and elevated FLP. Pt is agreeable to starting daily statin therapy, will f/u in 3 months via audio virtual with fasting labs prior to evaluate. Agreeable to Cologard order today, denies any acute issues at this time.   Denies chest pain, shortness of breath, cough, fever, headache, dizziness, weakness, abdominal pain, nausea, vomiting, diarrhea, constipation, black/bloody stools, unplanned weight loss, night sweats, changes in urinary patterns, burning/odor with urination, depression, anxiety, and SI/HI.       Follow-up      Review of Systems     Review of Systems   Constitutional: Negative.    HENT: Negative.     Eyes: Negative.    Respiratory: Negative.     Cardiovascular: Negative.    Gastrointestinal: Negative.    Endocrine: Negative.    Genitourinary: Negative.    Neurological: Negative.    Psychiatric/Behavioral: Negative.         Medications and Allergies     Medications  Medication List with Changes/Refills   New Medications    ATORVASTATIN (LIPITOR) 20 MG  TABLET    Take 1 tablet (20 mg total) by mouth once daily. For High Cholesterol   Current Medications    ASPIRIN-CAFFEINE (DESIRAE BACK AND BODY) 500-32.5 MG TAB    Take by mouth.         Allergies  Review of patient's allergies indicates:  No Known Allergies    Physical Examination     Vitals:    01/24/23 0810   BP: 113/72   Pulse: (!) 59   Resp: 16   Temp: 97 °F (36.1 °C)     Physical Exam  Vitals reviewed.   Constitutional:       Appearance: Normal appearance. He is normal weight.   HENT:      Head: Normocephalic.   Cardiovascular:      Rate and Rhythm: Normal rate and regular rhythm.      Pulses: Normal pulses.      Heart sounds: Normal heart sounds.   Pulmonary:      Effort: Pulmonary effort is normal.      Breath sounds: Normal breath sounds.   Abdominal:      General: Abdomen is flat.      Palpations: Abdomen is soft.   Musculoskeletal:         General: Normal range of motion.      Cervical back: Normal range of motion.   Skin:     General: Skin is warm and dry.   Neurological:      Mental Status: He is alert.   Psychiatric:         Mood and Affect: Mood normal.         Results     Lab Results   Component Value Date    WBC 6.9 01/11/2023    RBC 5.37 01/11/2023    HGB 16.2 01/11/2023    HCT 49.2 01/11/2023    MCV 91.6 01/11/2023    MCH 30.2 01/11/2023    MCHC 32.9 (L) 01/11/2023    RDW 12.5 01/11/2023     01/11/2023    MPV 9.9 01/11/2023     Sodium Level   Date Value Ref Range Status   01/11/2023 143 136 - 145 mmol/L Final     Potassium Level   Date Value Ref Range Status   01/11/2023 4.6 3.5 - 5.1 mmol/L Final     Carbon Dioxide   Date Value Ref Range Status   01/11/2023 27 22 - 29 mmol/L Final     Blood Urea Nitrogen   Date Value Ref Range Status   01/11/2023 13.8 8.9 - 20.6 mg/dL Final     Creatinine   Date Value Ref Range Status   01/11/2023 1.24 (H) 0.73 - 1.18 mg/dL Final     Calcium Level Total   Date Value Ref Range Status   01/11/2023 10.2 8.4 - 10.2 mg/dL Final     Albumin Level   Date Value Ref  Range Status   01/11/2023 4.0 3.5 - 5.0 g/dL Final     Bilirubin Total   Date Value Ref Range Status   01/11/2023 0.7 <=1.5 mg/dL Final     Alkaline Phosphatase   Date Value Ref Range Status   01/11/2023 80 40 - 150 unit/L Final     Aspartate Aminotransferase   Date Value Ref Range Status   01/11/2023 24 5 - 34 unit/L Final     Alanine Aminotransferase   Date Value Ref Range Status   01/11/2023 21 0 - 55 unit/L Final     Estimated GFR-Non    Date Value Ref Range Status   06/26/2020 64 (L) >>=90 mL/min Final     Lab Results   Component Value Date    CHOL 261 (H) 01/11/2023     Lab Results   Component Value Date    HDL 60 01/11/2023     No results found for: LDLCALC  Lab Results   Component Value Date    TRIG 125 01/11/2023     No results found for: CHOLHDL  Lab Results   Component Value Date    TSH 1.089 01/11/2023     Lab Results   Component Value Date    PHUR 5.5 06/26/2020    PROTEINUA Trace (A) 12/09/2022    GLUCUA Negative 06/26/2020    KETONESU Trace (A) 06/26/2020    OCCULTUA Negative 06/26/2020    NITRITE Negative 06/26/2020    LEUKOCYTESUR Negative 12/09/2022     Lab Results   Component Value Date    HGBA1C 5.7 01/11/2023           Assessment         ICD-10-CM ICD-9-CM   1. Syphilis  A53.9 097.9   2. Prediabetes  R73.03 790.29   3. Mixed hyperlipidemia  E78.2 272.2   4. Colon cancer screening  Z12.11 V76.51   5. Wellness examination  Z00.00 V70.0   6. Prostate cancer screening  Z12.5 V76.44       Plan      Problem List Items Addressed This Visit          Cardiac/Vascular    Mixed hyperlipidemia    Current Assessment & Plan     RX Atorvastatin 20 mg q hs  3 month f/u via audio virtual   Follow a low cholesterol, low saturated fat diet with less than 200 mg of cholesterol a day.   Avoid fried foods and high saturated fats (cohen, sausage, cookies, cakes, chips, cheese, whole milk, butter, mayonnaise, creamy dressings, gravy, stew, gumbo, boudin, cracklins and cream sauces).  Add flax seed or  fish oil supplements to diet.   Increase dietary fiber.   Regular exercise improves cholesterol levels.  Physical activity 5 times a week for 30 minutes per day (or 150 minutes per week).   Stressed importance of dietary modifications.           Relevant Medications    atorvastatin (LIPITOR) 20 MG tablet    Other Relevant Orders    Basic Metabolic Panel    Lipid Panel    Urinalysis, Reflex to Urine Culture       ID    Syphilis - Primary    Current Assessment & Plan     RX Bicillin 2.4 x 3 IM in clinic          Relevant Medications    penicillin G benzathine (BICILLIN LA) injection 2.4 Million Units (Start on 1/24/2023  9:45 AM)       Endocrine    Prediabetes    Current Assessment & Plan     Follow ADA diet.  Avoid soda, simple sweets, and limit rice/pasta/bread/starches and consume brown options when possible.   Maintain healthy weight with BMI goal <30.   Perform aerobic exercise for 150 minutes per week (or 5 days a week for 30 minutes each day).               Other Visit Diagnoses       Colon cancer screening        Relevant Orders    Cologuard Screening (Multitarget Stool DNA)    Wellness examination        Relevant Orders    Vitamin D    Prostate cancer screening        Relevant Orders    PSA, Screening            Future Appointments   Date Time Provider Department Center   5/18/2023  1:00 PM Shanell Nassar NP [facility]            Signature:     [facility]  [address] [facility] [address]    Date of encounter: 1/24/23

## 2023-01-30 DIAGNOSIS — A53.9 SYPHILIS: Primary | ICD-10-CM

## 2023-02-01 ENCOUNTER — CLINICAL SUPPORT (OUTPATIENT)
Dept: INTERNAL MEDICINE | Facility: CLINIC | Age: 48
End: 2023-02-01
Payer: MEDICAID

## 2023-02-01 VITALS — HEIGHT: 64 IN | WEIGHT: 182.13 LBS | TEMPERATURE: 97 F | BODY MASS INDEX: 31.09 KG/M2

## 2023-02-01 DIAGNOSIS — A53.9 SYPHILIS: Primary | ICD-10-CM

## 2023-02-01 PROCEDURE — 96372 THER/PROPH/DIAG INJ SC/IM: CPT | Mod: PBBFAC

## 2023-02-01 PROCEDURE — 99212 OFFICE O/P EST SF 10 MIN: CPT | Mod: PBBFAC,25

## 2023-02-01 RX ADMIN — PENICILLIN G BENZATHINE 2.4 MILLION UNITS: 1200000 INJECTION, SUSPENSION INTRAMUSCULAR at 01:02

## 2023-02-01 NOTE — PROGRESS NOTES
Patient presented to [facility] [place] [place] for #2 in series of 3 Bicillin L-A 2.4 million units injections. Patient has a history of syphilis.  Bicillin L-A (penicillin G benzathine ) 1.2 million units administered IM to right gluteus medius using aseptic technique and Bicillin L-A (penicillin G benzathine) 1.2 million units administered IM to left gluteus medius  using aseptic technique. Patient was given a total of 2.4 million units of Bicillin L-A . Patient tolerated procedure well with no adverse reactions noted. Patient was given appointment letter to return to clinic in one week for # 3 in series of 3 Bicillin L-A injections. ( Wednesday, February 08,2023 at 12:30 pm.). Patient instructed to call if need [phone]. Patient voiced understanding of this information.

## 2023-02-06 LAB — NONINV COLON CA DNA+OCC BLD SCRN STL QL: NORMAL

## 2023-02-07 DIAGNOSIS — A53.9 SYPHILIS: Primary | ICD-10-CM

## 2023-02-08 ENCOUNTER — CLINICAL SUPPORT (OUTPATIENT)
Dept: INTERNAL MEDICINE | Facility: CLINIC | Age: 48
End: 2023-02-08
Payer: MEDICAID

## 2023-02-08 VITALS — WEIGHT: 182.13 LBS | BODY MASS INDEX: 35.76 KG/M2 | TEMPERATURE: 97 F | HEIGHT: 60 IN

## 2023-02-08 DIAGNOSIS — A53.9 SYPHILIS: Primary | ICD-10-CM

## 2023-02-08 PROCEDURE — 96372 THER/PROPH/DIAG INJ SC/IM: CPT | Mod: PBBFAC

## 2023-02-08 PROCEDURE — 99212 OFFICE O/P EST SF 10 MIN: CPT | Mod: PBBFAC,25

## 2023-02-08 RX ADMIN — PENICILLIN G BENZATHINE 2.4 MILLION UNITS: 1200000 INJECTION, SUSPENSION INTRAMUSCULAR at 01:02

## 2023-02-08 NOTE — PROGRESS NOTES
Presented to [facility] for #3 in series of 3 Bicillin L-A (penicillin G benzathine) 2.4 million units injections. Patient has a history of syphilis. Bicillin L-A (penicillin G benzathine) 1.2 million units administered IM to right gluteus medius using aseptic technique and Bicillin L-A (penicillin G benzathine) 1.2 million units administered IM to left gluteus medius using aseptic technique. A total of 2.4 million units of Bicillin L-A given. Patient tolerated procedure  well with no adverse reactions noted. This completes the series of 3 Bicillin L-A 2.4 million units injections for treatment. Call if need [phone].     Spontaneous, unlabored and symmetrical

## 2023-04-03 PROBLEM — N17.9 AKI (ACUTE KIDNEY INJURY): Status: RESOLVED | Noted: 2022-12-29 | Resolved: 2023-04-03

## 2023-04-27 ENCOUNTER — LAB VISIT (OUTPATIENT)
Dept: LAB | Facility: HOSPITAL | Age: 48
End: 2023-04-27
Attending: NURSE PRACTITIONER
Payer: MEDICAID

## 2023-04-27 DIAGNOSIS — E78.2 MIXED HYPERLIPIDEMIA: ICD-10-CM

## 2023-04-27 DIAGNOSIS — Z00.00 WELLNESS EXAMINATION: ICD-10-CM

## 2023-04-27 DIAGNOSIS — Z12.5 PROSTATE CANCER SCREENING: ICD-10-CM

## 2023-04-27 LAB
ANION GAP SERPL CALC-SCNC: 8 MEQ/L
BUN SERPL-MCNC: 17.3 MG/DL (ref 8.9–20.6)
CALCIUM SERPL-MCNC: 10.1 MG/DL (ref 8.4–10.2)
CHLORIDE SERPL-SCNC: 107 MMOL/L (ref 98–107)
CHOLEST SERPL-MCNC: 235 MG/DL
CHOLEST/HDLC SERPL: 4 {RATIO} (ref 0–5)
CO2 SERPL-SCNC: 23 MMOL/L (ref 22–29)
CREAT SERPL-MCNC: 1.1 MG/DL (ref 0.73–1.18)
CREAT/UREA NIT SERPL: 16
DEPRECATED CALCIDIOL+CALCIFEROL SERPL-MC: 36.7 NG/ML (ref 30–80)
GFR SERPLBLD CREATININE-BSD FMLA CKD-EPI: >60 MLS/MIN/1.73/M2
GLUCOSE SERPL-MCNC: 102 MG/DL (ref 74–100)
HDLC SERPL-MCNC: 61 MG/DL (ref 35–60)
LDLC SERPL CALC-MCNC: 156 MG/DL (ref 50–140)
POTASSIUM SERPL-SCNC: 4.2 MMOL/L (ref 3.5–5.1)
PSA SERPL-MCNC: 0.78 NG/ML
SODIUM SERPL-SCNC: 138 MMOL/L (ref 136–145)
TRIGL SERPL-MCNC: 90 MG/DL (ref 34–140)
VLDLC SERPL CALC-MCNC: 18 MG/DL

## 2023-04-27 PROCEDURE — 84153 ASSAY OF PSA TOTAL: CPT

## 2023-04-27 PROCEDURE — 82306 VITAMIN D 25 HYDROXY: CPT

## 2023-04-27 PROCEDURE — 80048 BASIC METABOLIC PNL TOTAL CA: CPT

## 2023-04-27 PROCEDURE — 80061 LIPID PANEL: CPT

## 2023-04-27 PROCEDURE — 36415 COLL VENOUS BLD VENIPUNCTURE: CPT

## 2023-05-11 ENCOUNTER — OFFICE VISIT (OUTPATIENT)
Dept: INTERNAL MEDICINE | Facility: CLINIC | Age: 48
End: 2023-05-11
Payer: MEDICAID

## 2023-05-11 VITALS
HEART RATE: 71 BPM | BODY MASS INDEX: 32.2 KG/M2 | WEIGHT: 164 LBS | DIASTOLIC BLOOD PRESSURE: 66 MMHG | TEMPERATURE: 98 F | RESPIRATION RATE: 16 BRPM | SYSTOLIC BLOOD PRESSURE: 108 MMHG | HEIGHT: 60 IN

## 2023-05-11 DIAGNOSIS — E78.2 MIXED HYPERLIPIDEMIA: Primary | ICD-10-CM

## 2023-05-11 PROCEDURE — 3074F SYST BP LT 130 MM HG: CPT | Mod: CPTII,,, | Performed by: NURSE PRACTITIONER

## 2023-05-11 PROCEDURE — 3008F BODY MASS INDEX DOCD: CPT | Mod: CPTII,,, | Performed by: NURSE PRACTITIONER

## 2023-05-11 PROCEDURE — 1159F PR MEDICATION LIST DOCUMENTED IN MEDICAL RECORD: ICD-10-PCS | Mod: CPTII,,, | Performed by: NURSE PRACTITIONER

## 2023-05-11 PROCEDURE — 1159F MED LIST DOCD IN RCRD: CPT | Mod: CPTII,,, | Performed by: NURSE PRACTITIONER

## 2023-05-11 PROCEDURE — 3078F PR MOST RECENT DIASTOLIC BLOOD PRESSURE < 80 MM HG: ICD-10-PCS | Mod: CPTII,,, | Performed by: NURSE PRACTITIONER

## 2023-05-11 PROCEDURE — 3078F DIAST BP <80 MM HG: CPT | Mod: CPTII,,, | Performed by: NURSE PRACTITIONER

## 2023-05-11 PROCEDURE — 1160F PR REVIEW ALL MEDS BY PRESCRIBER/CLIN PHARMACIST DOCUMENTED: ICD-10-PCS | Mod: CPTII,,, | Performed by: NURSE PRACTITIONER

## 2023-05-11 PROCEDURE — 99214 OFFICE O/P EST MOD 30 MIN: CPT | Mod: S$PBB,,, | Performed by: NURSE PRACTITIONER

## 2023-05-11 PROCEDURE — 99213 OFFICE O/P EST LOW 20 MIN: CPT | Mod: PBBFAC | Performed by: NURSE PRACTITIONER

## 2023-05-11 PROCEDURE — 99214 PR OFFICE/OUTPT VISIT, EST, LEVL IV, 30-39 MIN: ICD-10-PCS | Mod: S$PBB,,, | Performed by: NURSE PRACTITIONER

## 2023-05-11 PROCEDURE — 3008F PR BODY MASS INDEX (BMI) DOCUMENTED: ICD-10-PCS | Mod: CPTII,,, | Performed by: NURSE PRACTITIONER

## 2023-05-11 PROCEDURE — 1160F RVW MEDS BY RX/DR IN RCRD: CPT | Mod: CPTII,,, | Performed by: NURSE PRACTITIONER

## 2023-05-11 PROCEDURE — 3074F PR MOST RECENT SYSTOLIC BLOOD PRESSURE < 130 MM HG: ICD-10-PCS | Mod: CPTII,,, | Performed by: NURSE PRACTITIONER

## 2023-05-11 RX ORDER — ATORVASTATIN CALCIUM 40 MG/1
40 TABLET, FILM COATED ORAL NIGHTLY
Qty: 30 TABLET | Refills: 3 | Status: SHIPPED | OUTPATIENT
Start: 2023-05-11 | End: 2023-12-29

## 2023-05-11 NOTE — PROGRESS NOTES
KERA Neves   [facility]  [address] [facility] [address]      PATIENT NAME: Kobe Sidhu  : 1975  DATE: 23  MRN: [id number]      Patient PCP Information       Provider PCP Type    KERA Neves General            Reason for Visit / Chief Complaint: Follow-up (Lab review )       History of Present Illness / Problem Focused Workflow     Kobe Sidhu presents to the clinic with Follow-up (Lab review )     48 yo AAM here today to f/u for lab review. PMH prediabetes, HLD, hx syphillis (treated).      Prostate Cancer Screening - 23 PSA 0.78  Colon Cancer Screening -  23 FIT Ordered  Osteoporosis Screening - 23 Vitamin D level 36.7  STI Screening - Negative     23  Pt here today for lab review evaluation, NP Cesario patient who will switch to me now due to f/u needed. Labs reviewed, pt with positive syphilis AB, was positive back in  but no treatment giarun, is agreeable to Bicillin IM x 1 today then repeat x 2 more doses in clinic. Discussed prediabetes status and elevated FLP. Pt is agreeable to starting daily statin therapy, will f/u in 3 months via audio virtual with fasting labs prior to evaluate.     2023  Pt here today for 3 month f/u for HLD. Labs completed and reviewed, FLP improved since LOV with initiation of atorvastatin 20 mg q hs but not at goal, will increase dosage today to 40 mg qhs and f/u again in 3 months with fasting labs. Denies any other issues or concerns at this time.   Denies chest pain, shortness of breath, cough, fever, headache, dizziness, weakness, abdominal pain, nausea, vomiting, diarrhea, constipation, black/bloody stools, unplanned weight loss, night sweats, changes in urinary patterns, burning/odor with urination, depression, anxiety, and SI/HI.           Review of Systems     Review of Systems   Constitutional: Negative.    HENT: Negative.     Eyes: Negative.     Respiratory: Negative.     Cardiovascular: Negative.    Gastrointestinal: Negative.    Endocrine: Negative.    Genitourinary: Negative.    Neurological: Negative.    Psychiatric/Behavioral: Negative.         Medications and Allergies     Medications  Current Outpatient Medications   Medication Instructions    aspirin-caffeine (DESIRAE BACK AND BODY) 500-32.5 mg Tab Oral    atorvastatin (LIPITOR) 20 mg, Oral, Daily, For High Cholesterol    atorvastatin (LIPITOR) 40 mg, Oral, Nightly, For High Cholesterol         Allergies  Review of patient's allergies indicates:  No Known Allergies    Physical Examination     Visit Vitals  /66   Pulse 71   Temp 98 °F (36.7 °C)   Resp 16   Ht 5' (1.524 m)   Wt 74.4 kg (164 lb)   BMI 32.03 kg/m²       Physical Exam  Vitals reviewed.   Constitutional:       Appearance: Normal appearance. He is normal weight.   HENT:      Head: Normocephalic.   Cardiovascular:      Rate and Rhythm: Normal rate and regular rhythm.      Pulses: Normal pulses.      Heart sounds: Normal heart sounds.   Pulmonary:      Effort: Pulmonary effort is normal.      Breath sounds: Normal breath sounds.   Abdominal:      General: Abdomen is flat.      Palpations: Abdomen is soft.   Musculoskeletal:         General: Normal range of motion.      Cervical back: Normal range of motion.   Skin:     General: Skin is warm and dry.   Neurological:      Mental Status: He is alert.   Psychiatric:         Mood and Affect: Mood normal.         Results     Lab Results   Component Value Date    WBC 6.9 01/11/2023    RBC 5.37 01/11/2023    HGB 16.2 01/11/2023    HCT 49.2 01/11/2023    MCV 91.6 01/11/2023    MCH 30.2 01/11/2023    MCHC 32.9 (L) 01/11/2023    RDW 12.5 01/11/2023     01/11/2023    MPV 9.9 01/11/2023     CMP  Sodium Level   Date Value Ref Range Status   04/27/2023 138 136 - 145 mmol/L Final     Potassium Level   Date Value Ref Range Status   04/27/2023 4.2 3.5 - 5.1 mmol/L Final     Carbon Dioxide   Date  Value Ref Range Status   04/27/2023 23 22 - 29 mmol/L Final     Blood Urea Nitrogen   Date Value Ref Range Status   04/27/2023 17.3 8.9 - 20.6 mg/dL Final     Creatinine   Date Value Ref Range Status   04/27/2023 1.10 0.73 - 1.18 mg/dL Final     Calcium Level Total   Date Value Ref Range Status   04/27/2023 10.1 8.4 - 10.2 mg/dL Final     Albumin Level   Date Value Ref Range Status   01/11/2023 4.0 3.5 - 5.0 g/dL Final     Bilirubin Total   Date Value Ref Range Status   01/11/2023 0.7 <=1.5 mg/dL Final     Alkaline Phosphatase   Date Value Ref Range Status   01/11/2023 80 40 - 150 unit/L Final     Aspartate Aminotransferase   Date Value Ref Range Status   01/11/2023 24 5 - 34 unit/L Final     Alanine Aminotransferase   Date Value Ref Range Status   01/11/2023 21 0 - 55 unit/L Final     Estimated GFR-Non    Date Value Ref Range Status   06/26/2020 64 (L) >>=90 mL/min Final     Lab Results   Component Value Date    CHOL 235 (H) 04/27/2023     Lab Results   Component Value Date    HDL 61 (H) 04/27/2023     No results found for: LDLCALC  Lab Results   Component Value Date    TRIG 90 04/27/2023     No results found for: CHOLHDL  Lab Results   Component Value Date    TSH 1.089 01/11/2023         Assessment        ICD-10-CM ICD-9-CM   1. Mixed hyperlipidemia  E78.2 272.2        Plan      Problem List Items Addressed This Visit          Cardiac/Vascular    Mixed hyperlipidemia - Primary    Relevant Medications    atorvastatin (LIPITOR) 40 MG tablet    Other Relevant Orders    Lipid Panel    Urinalysis, Reflex to Urine Culture    Basic Metabolic Panel       No future appointments.       Follow up in about 3 months (around 8/11/2023).      Signature:     [facility]  [address] [facility] [address] [facility] [address]    Date of encounter: 5/11/23

## 2023-05-17 ENCOUNTER — HOSPITAL ENCOUNTER (EMERGENCY)
Facility: HOSPITAL | Age: 48
Discharge: HOME | End: 2023-05-17
Attending: FAMILY MEDICINE
Payer: MEDICAID

## 2023-05-17 VITALS
HEIGHT: 65 IN | DIASTOLIC BLOOD PRESSURE: 61 MMHG | SYSTOLIC BLOOD PRESSURE: 102 MMHG | HEART RATE: 58 BPM | RESPIRATION RATE: 18 BRPM | OXYGEN SATURATION: 100 % | BODY MASS INDEX: 27.51 KG/M2 | TEMPERATURE: 98 F | WEIGHT: 165.13 LBS

## 2023-05-17 DIAGNOSIS — M79.662 PAIN IN LEFT LOWER LEG: ICD-10-CM

## 2023-05-17 DIAGNOSIS — S86.812A STRAIN OF CALF MUSCLE, LEFT, INITIAL ENCOUNTER: Primary | ICD-10-CM

## 2023-05-17 PROCEDURE — 99284 EMERGENCY DEPT VISIT MOD MDM: CPT

## 2023-05-17 PROCEDURE — 25000003 PHARM REV CODE 250: Performed by: PHYSICIAN ASSISTANT

## 2023-05-17 PROCEDURE — 63600175 PHARM REV CODE 636 W HCPCS: Performed by: PHYSICIAN ASSISTANT

## 2023-05-17 PROCEDURE — 96372 THER/PROPH/DIAG INJ SC/IM: CPT | Performed by: PHYSICIAN ASSISTANT

## 2023-05-17 RX ORDER — METHOCARBAMOL 500 MG/1
500 TABLET, FILM COATED ORAL
Status: COMPLETED | OUTPATIENT
Start: 2023-05-17 | End: 2023-05-17

## 2023-05-17 RX ORDER — METHOCARBAMOL 500 MG/1
500 TABLET, FILM COATED ORAL 3 TIMES DAILY PRN
Qty: 15 TABLET | Refills: 0 | Status: SHIPPED | OUTPATIENT
Start: 2023-05-17 | End: 2023-05-22

## 2023-05-17 RX ORDER — DICLOFENAC SODIUM 10 MG/G
2 GEL TOPICAL 2 TIMES DAILY PRN
Qty: 200 G | Refills: 0 | Status: ON HOLD | OUTPATIENT
Start: 2023-05-17 | End: 2024-01-03 | Stop reason: HOSPADM

## 2023-05-17 RX ORDER — DICLOFENAC SODIUM 75 MG/1
75 TABLET, DELAYED RELEASE ORAL 2 TIMES DAILY PRN
Qty: 30 TABLET | Refills: 0 | Status: SHIPPED | OUTPATIENT
Start: 2023-05-17 | End: 2023-08-26 | Stop reason: SDUPTHER

## 2023-05-17 RX ORDER — KETOROLAC TROMETHAMINE 30 MG/ML
30 INJECTION, SOLUTION INTRAMUSCULAR; INTRAVENOUS
Status: COMPLETED | OUTPATIENT
Start: 2023-05-17 | End: 2023-05-17

## 2023-05-17 RX ADMIN — KETOROLAC TROMETHAMINE 30 MG: 30 INJECTION, SOLUTION INTRAMUSCULAR; INTRAVENOUS at 01:05

## 2023-05-17 RX ADMIN — METHOCARBAMOL 500 MG: 500 TABLET ORAL at 01:05

## 2023-05-17 NOTE — DISCHARGE INSTRUCTIONS
Alternate diclofenac pills and gel pain.  Elevate leg as much as possible.  Alternate heat and ice.  Return if symptoms worsen or do not improve.  Mild activity until symptoms improve.  Follow up with your primary care provider within 2-3 days.

## 2023-05-17 NOTE — ED PROVIDER NOTES
Encounter Date: 2023       History     Chief Complaint   Patient presents with    Leg Pain     States left posterior lower leg pain after pushing a truck.       47-year-old male presents to the emergency department with left calf pain that began an hour ago after he tried pushing a truck.  He states he felt some pull in left calf while pushing truck.  He rates the pain 8/10 and has not taken anything for pain.  He denies shortness of breath, fever, chills, weakness, numbness, tingling.    The history is provided by the patient. No  was used.   Review of patient's allergies indicates:  No Known Allergies  History reviewed. No pertinent past medical history.  Past Surgical History:   Procedure Laterality Date    APPENDECTOMY       Family History   Problem Relation Age of Onset    Cancer Mother     Cancer Maternal Grandmother      Social History     Tobacco Use    Smoking status: Former     Packs/day: 0.25     Years: 1.50     Pack years: 0.38     Types: Cigarettes     Quit date:      Years since quittin.3    Smokeless tobacco: Never   Substance Use Topics    Alcohol use: Yes     Alcohol/week: 2.0 standard drinks     Types: 2 Cans of beer per week     Comment: only for football    Drug use: Never     Review of Systems   Constitutional:  Negative for chills and fever.   Respiratory:  Negative for shortness of breath.    Cardiovascular:  Negative for chest pain.   Gastrointestinal:  Negative for abdominal pain, nausea and vomiting.   Musculoskeletal:  Negative for back pain and joint swelling.        Left calf pain after pushing a truck   Skin:  Negative for color change, pallor, rash and wound.   Neurological:  Negative for dizziness, light-headedness and headaches.     Physical Exam     Initial Vitals [23 0105]   BP Pulse Resp Temp SpO2   110/67 65 17 98.2 °F (36.8 °C) 100 %      MAP       --         Physical Exam    Nursing note and vitals reviewed.  Constitutional: He appears  well-developed and well-nourished.   HENT:   Head: Normocephalic and atraumatic.   Nose: Nose normal.   Mouth/Throat: Oropharynx is clear and moist.   Eyes: Conjunctivae are normal.   Neck: Neck supple.   Normal range of motion.  Cardiovascular:  Normal rate, regular rhythm, normal heart sounds and intact distal pulses.           Pulmonary/Chest: Breath sounds normal.   Abdominal: Abdomen is soft. Bowel sounds are normal. There is no abdominal tenderness. There is no rebound and no guarding.   Musculoskeletal:         General: Normal range of motion.      Cervical back: Normal range of motion and neck supple.        Legs:      Neurological: He is alert and oriented to person, place, and time. GCS score is 15. GCS eye subscore is 4. GCS verbal subscore is 5. GCS motor subscore is 6.   Skin: Skin is warm. Capillary refill takes less than 2 seconds.       ED Course   Procedures  Labs Reviewed - No data to display       Imaging Results              X-Ray Tibia Fibula 2 View Left (Preliminary result)  Result time 05/17/23 02:48:52      Wet Read by Mickey Layne MD (05/17/23 02:48:52, [facility] Medicine)    No acute abnormality                                     Medications   methocarbamoL tablet 500 mg (500 mg Oral Given 5/17/23 0147)   ketorolac injection 30 mg (30 mg Intramuscular Given 5/17/23 0147)     Medical Decision Making:   Initial Assessment:   47-year-old male presents to the emergency department with left calf pain that began an hour ago after he tried pushing a truck.   Differential Diagnosis:   Muscle strain  Muscle spasm  Fracture  Clinical Tests:   Radiological Study: Ordered and Reviewed  ED Management:  Medications given:  Robaxin 500 mg tablet, Toradol 30 mg IM    X-ray left tib-fib ordered  The patient is resting comfortably and in no acute distress.  He states that his symptoms have improved after treatment in Emergency Department. I personally discussed  his test results and treatment plan.  Gave strict ED precautions and specific conditions for return to the emergency department and importance of follow up with pcp.  Patient voices understanding and agrees to the plan discussed. All patients' questions have been answered at this time. He has remained hemodynamically stable throughout entire stay in ED and is stable for discharge home.            ED Course as of 05/17/23 0249   Wed May 17, 2023   0213 Patient transitioned to Dr. Layne at this time.  Awaiting x-ray of left lower leg [ER]      ED Course User Index  [ER] LEN Helms                 Clinical Impression:   Final diagnoses:  [M79.662] Pain in left lower leg  [S86.812A] Strain of calf muscle, left, initial encounter (Primary)        ED Disposition Condition    Discharge Stable          ED Prescriptions       Medication Sig Dispense Start Date End Date Auth. Provider    diclofenac sodium (VOLTAREN) 1 % Gel Apply 2 g topically 2 (two) times daily as needed. For lower extremity joints, apply 4 g on joint up to 4x a day.  For upper extremity joints, apply 2 g on joint up to 4x a day.  Do not use this at the same time as taking an oral NSAID such as Diclofenac, Ibuprofen, Mobic, Advil, Aleve, Naproxen etc.  However you can alternate this topical NSAID with oral NSAID.   You can take Tylenol while using this medication. 200 g 5/17/2023 -- LEN Helms    diclofenac (VOLTAREN) 75 MG EC tablet Take 1 tablet (75 mg total) by mouth 2 (two) times daily as needed (pain). Do not take this with Advil, Ibuprofen, Motrin, Asprin, or Toradol. 30 tablet 5/17/2023 -- LEN Helms    methocarbamoL (ROBAXIN) 500 MG Tab Take 1 tablet (500 mg total) by mouth 3 (three) times daily as needed (muscle spasm). For muscle spasm 15 tablet 5/17/2023 5/22/2023 LEN Helms          Follow-up Information       Follow up With Specialties Details Why Contact Info    [facility] Emergency  Medicine  As needed, If symptoms worsen [address] [facility] [address] [phone]    KERA Neves Nurse Practitioner   [address] [facility] [facility] [address]  [phone]               Mickey Layne MD  05/17/23 8404

## 2023-08-25 ENCOUNTER — HOSPITAL ENCOUNTER (EMERGENCY)
Facility: HOSPITAL | Age: 48
Discharge: HOME | End: 2023-08-26
Attending: FAMILY MEDICINE
Payer: MEDICAID

## 2023-08-25 DIAGNOSIS — S39.012A LUMBAR STRAIN, INITIAL ENCOUNTER: Primary | ICD-10-CM

## 2023-08-25 PROCEDURE — 99284 EMERGENCY DEPT VISIT MOD MDM: CPT

## 2023-08-25 RX ORDER — KETOROLAC TROMETHAMINE 30 MG/ML
60 INJECTION, SOLUTION INTRAMUSCULAR; INTRAVENOUS
Status: COMPLETED | OUTPATIENT
Start: 2023-08-26 | End: 2023-08-26

## 2023-08-25 RX ORDER — METHOCARBAMOL 500 MG/1
1000 TABLET, FILM COATED ORAL
Status: COMPLETED | OUTPATIENT
Start: 2023-08-26 | End: 2023-08-26

## 2023-08-25 NOTE — Clinical Note
"Kobe Frankelkenzie Sidhu was seen and treated in our emergency department on 8/25/2023.  He may return to work on 08/27/2023.       If you have any questions or concerns, please don't hesitate to call.      Corey BOWLES    "

## 2023-08-26 VITALS
OXYGEN SATURATION: 100 % | TEMPERATURE: 98 F | HEART RATE: 51 BPM | RESPIRATION RATE: 16 BRPM | WEIGHT: 163.13 LBS | HEIGHT: 65 IN | DIASTOLIC BLOOD PRESSURE: 73 MMHG | BODY MASS INDEX: 27.18 KG/M2 | SYSTOLIC BLOOD PRESSURE: 110 MMHG

## 2023-08-26 LAB
APPEARANCE UR: CLEAR
BACTERIA #/AREA URNS AUTO: ABNORMAL /HPF
BILIRUB UR QL STRIP.AUTO: NEGATIVE
COLOR UR: YELLOW
GLUCOSE UR QL STRIP.AUTO: NORMAL
HYALINE CASTS #/AREA URNS LPF: ABNORMAL /LPF
KETONES UR QL STRIP.AUTO: NEGATIVE
LEUKOCYTE ESTERASE UR QL STRIP.AUTO: NEGATIVE
MUCOUS THREADS URNS QL MICRO: ABNORMAL /LPF
NITRITE UR QL STRIP.AUTO: NEGATIVE
PH UR STRIP.AUTO: 5.5 [PH]
PROT UR QL STRIP.AUTO: ABNORMAL
RBC #/AREA URNS AUTO: ABNORMAL /HPF
RBC UR QL AUTO: NEGATIVE
SP GR UR STRIP.AUTO: 1.03
SQUAMOUS #/AREA URNS LPF: ABNORMAL /HPF
UROBILINOGEN UR STRIP-ACNC: NORMAL
WBC #/AREA URNS AUTO: ABNORMAL /HPF

## 2023-08-26 PROCEDURE — 63600175 PHARM REV CODE 636 W HCPCS: Performed by: FAMILY MEDICINE

## 2023-08-26 PROCEDURE — 25000003 PHARM REV CODE 250: Performed by: FAMILY MEDICINE

## 2023-08-26 PROCEDURE — 96372 THER/PROPH/DIAG INJ SC/IM: CPT | Performed by: FAMILY MEDICINE

## 2023-08-26 PROCEDURE — 81001 URINALYSIS AUTO W/SCOPE: CPT | Performed by: FAMILY MEDICINE

## 2023-08-26 RX ORDER — BACLOFEN 10 MG/1
10 TABLET ORAL 3 TIMES DAILY PRN
Qty: 30 TABLET | Refills: 0 | Status: SHIPPED | OUTPATIENT
Start: 2023-08-26 | End: 2023-12-29

## 2023-08-26 RX ORDER — DICLOFENAC SODIUM 75 MG/1
75 TABLET, DELAYED RELEASE ORAL 2 TIMES DAILY PRN
Qty: 28 TABLET | Refills: 0 | Status: SHIPPED | OUTPATIENT
Start: 2023-08-26 | End: 2023-09-09

## 2023-08-26 RX ADMIN — KETOROLAC TROMETHAMINE 60 MG: 30 INJECTION, SOLUTION INTRAMUSCULAR; INTRAVENOUS at 12:08

## 2023-08-26 RX ADMIN — METHOCARBAMOL 1000 MG: 500 TABLET ORAL at 12:08

## 2023-08-26 NOTE — ED PROVIDER NOTES
Encounter Date: 2023       History     Chief Complaint   Patient presents with    Flank Pain     Pt c/o left flank pain that is similar to an muscle injury he had last month      Patient is a 47-year-old gentleman presenting emergency room with complaints of left lower back pain.  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  Denies any difficulty walking.  Denies any bladder or bowel incontinence.  Denies weakness.  Pain is worse when bending forward and then extending back upright.    The history is provided by the patient.     Review of patient's allergies indicates:  No Known Allergies  No past medical history on file.  Past Surgical History:   Procedure Laterality Date    APPENDECTOMY       Family History   Problem Relation Age of Onset    Cancer Mother     Cancer Maternal Grandmother      Social History     Tobacco Use    Smoking status: Former     Current packs/day: 0.00     Average packs/day: 0.3 packs/day for 1.5 years (0.4 ttl pk-yrs)     Types: Cigarettes     Start date: 2019     Quit date:      Years since quittin.6    Smokeless tobacco: Never   Substance Use Topics    Alcohol use: Yes     Alcohol/week: 2.0 standard drinks of alcohol     Types: 2 Cans of beer per week     Comment: only for football    Drug use: Never     Review of Systems   Constitutional:  Negative for chills, fatigue and fever.   HENT:  Negative for ear pain, rhinorrhea and sore throat.    Eyes:  Negative for photophobia and pain.   Respiratory:  Negative for cough, shortness of breath and wheezing.    Cardiovascular:  Negative for chest pain.   Gastrointestinal:  Negative for abdominal pain, diarrhea, nausea and vomiting.   Genitourinary:  Negative for dysuria.   Musculoskeletal:  Positive for back pain.   Neurological:  Negative for dizziness, weakness and headaches.   All other systems reviewed and are negative.      Physical Exam     Initial  Vitals [08/25/23 2346]   BP Pulse Resp Temp SpO2   118/78 69 20 97.5 °F (36.4 °C) 99 %      MAP       --         Physical Exam    Nursing note and vitals reviewed.  Constitutional: He appears well-developed and well-nourished.   HENT:   Head: Normocephalic and atraumatic.   Eyes: EOM are normal. Pupils are equal, round, and reactive to light.   Neck: Neck supple.   Normal range of motion.  Cardiovascular:  Normal rate, regular rhythm, normal heart sounds and intact distal pulses.     Exam reveals no gallop and no friction rub.       No murmur heard.  Pulmonary/Chest: Breath sounds normal. No respiratory distress. He has no wheezes. He has no rales.   Abdominal: Abdomen is soft. Bowel sounds are normal. He exhibits no distension. There is no abdominal tenderness.   Genitourinary:    Penis normal.     Musculoskeletal:         General: Normal range of motion.      Cervical back: Normal range of motion and neck supple.      Comments: Tenderness to palpation in the left lateral paralumbar region.  No tenderness to palpation of the lumbar spinal processes.  Decreased range of motion secondary to pain.  Associated muscle spasms appreciated.     Neurological: He is alert and oriented to person, place, and time. He has normal strength.   Skin: Skin is warm and dry.   Psychiatric: He has a normal mood and affect. His behavior is normal. Judgment and thought content normal.         ED Course   Procedures  Labs Reviewed   URINALYSIS, REFLEX TO URINE CULTURE - Abnormal; Notable for the following components:       Result Value    Protein, UA Trace (*)     Squamous Epithelial Cells, UA Trace (*)     Mucous, UA Trace (*)     All other components within normal limits          Imaging Results    None          Medications   ketorolac injection 60 mg (60 mg Intramuscular Given 8/26/23 0005)   methocarbamoL tablet 1,000 mg (1,000 mg Oral Given 8/26/23 0004)     Medical Decision Making  Patient is a 47-year-old gentleman presents  emergency room with complaints of left lumbar pain, reports happened after straining today at work.  Patient denies any nausea or vomiting.  No dysuria or hematuria.  No weakness of his lower extremities.  On physical examination, tenderness in the lumbar region with decreased range of motion consistent with a lumbar strain.  On review of the patient's medical records, patient was seen in clinic on 05/11/2023 (clinic note reviewed) at which time he was begun on atorvastatin 40 mg daily.  Patient reports that he never was able to go to the pharmacy to  the medication, and so has not been taking the Lipitor.    Differential diagnosis:  Lumbar strain, ureterolithiasis, dehydration    Medical decision making: Will obtain a urinalysis to evaluate for potential hematuria, the findings likely related to a lumbar strain.  Will treat symptomatically.  Will continue to monitor.    Amount and/or Complexity of Data Reviewed  External Data Reviewed: notes.     Details: See MDM above    Risk  Prescription drug management.               ED Course as of 08/26/23 0131   Sat Aug 26, 2023   0129 Patient in no distress; feeling improved.  Normal UA.  Stable for discharge to home.  ER precautions for any acute worsening. [MW]      ED Course User Index  [MW] Mickey Layne MD                    Clinical Impression:   Final diagnoses:  [S39.012A] Lumbar strain, initial encounter (Primary)        ED Disposition Condition    Discharge Stable          ED Prescriptions       Medication Sig Dispense Start Date End Date Auth. Provider    diclofenac (VOLTAREN) 75 MG EC tablet Take 1 tablet (75 mg total) by mouth 2 (two) times daily as needed (pain). Do not take this with Advil, Ibuprofen, Motrin, Asprin, or Toradol. 28 tablet 8/26/2023 9/9/2023 Mickey Layne MD    baclofen (LIORESAL) 10 MG tablet Take 1 tablet (10 mg total) by mouth 3 (three) times daily as needed (body aches). 30 tablet 8/26/2023 9/5/2023 Xi  Mickey MENDEZ MD          Follow-up Information       Follow up With Specialties Details Why Contact Info    [facility] Emergency Medicine  As needed, If symptoms worsen [address] [facility] [address] [phone]    Rissa Leal FNP Nurse Practitioner   [address] [facility] [address]  [phone]               Mickey Layne MD  08/26/23 0132

## 2023-11-08 ENCOUNTER — HOSPITAL ENCOUNTER (EMERGENCY)
Facility: HOSPITAL | Age: 48
Discharge: HOME | End: 2023-11-09
Attending: STUDENT IN AN ORGANIZED HEALTH CARE EDUCATION/TRAINING PROGRAM
Payer: MEDICAID

## 2023-11-08 ENCOUNTER — HOSPITAL ENCOUNTER (EMERGENCY)
Facility: HOSPITAL | Age: 48
Discharge: HOME | End: 2023-11-08
Attending: STUDENT IN AN ORGANIZED HEALTH CARE EDUCATION/TRAINING PROGRAM
Payer: MEDICAID

## 2023-11-08 VITALS
BODY MASS INDEX: 27.1 KG/M2 | SYSTOLIC BLOOD PRESSURE: 109 MMHG | WEIGHT: 162.69 LBS | DIASTOLIC BLOOD PRESSURE: 68 MMHG | TEMPERATURE: 98 F | HEART RATE: 64 BPM | HEIGHT: 65 IN | RESPIRATION RATE: 20 BRPM | OXYGEN SATURATION: 99 %

## 2023-11-08 DIAGNOSIS — K40.90 LEFT INGUINAL HERNIA: ICD-10-CM

## 2023-11-08 DIAGNOSIS — N42.83 CYST OF PROSTATE: Primary | ICD-10-CM

## 2023-11-08 DIAGNOSIS — K40.90 LEFT INGUINAL HERNIA: Primary | ICD-10-CM

## 2023-11-08 LAB
ALBUMIN SERPL-MCNC: 3.6 G/DL (ref 3.5–5)
ALBUMIN/GLOB SERPL: 1 RATIO (ref 1.1–2)
ALP SERPL-CCNC: 70 UNIT/L (ref 40–150)
ALT SERPL-CCNC: 18 UNIT/L (ref 0–55)
AST SERPL-CCNC: 20 UNIT/L (ref 5–34)
BASOPHILS # BLD AUTO: 0.05 X10(3)/MCL
BASOPHILS NFR BLD AUTO: 0.6 %
BILIRUB SERPL-MCNC: 0.7 MG/DL
BUN SERPL-MCNC: 18 MG/DL (ref 8.9–20.6)
CALCIUM SERPL-MCNC: 9.7 MG/DL (ref 8.4–10.2)
CHLORIDE SERPL-SCNC: 103 MMOL/L (ref 98–107)
CO2 SERPL-SCNC: 26 MMOL/L (ref 22–29)
CREAT SERPL-MCNC: 1.05 MG/DL (ref 0.73–1.18)
EOSINOPHIL # BLD AUTO: 0.39 X10(3)/MCL (ref 0–0.9)
EOSINOPHIL NFR BLD AUTO: 4.9 %
ERYTHROCYTE [DISTWIDTH] IN BLOOD BY AUTOMATED COUNT: 12.7 % (ref 11.5–17)
GFR SERPLBLD CREATININE-BSD FMLA CKD-EPI: >60 MLS/MIN/1.73/M2
GLOBULIN SER-MCNC: 3.6 GM/DL (ref 2.4–3.5)
GLUCOSE SERPL-MCNC: 72 MG/DL (ref 74–100)
HCT VFR BLD AUTO: 45.8 % (ref 42–52)
HGB BLD-MCNC: 14.4 G/DL (ref 14–18)
IMM GRANULOCYTES # BLD AUTO: 0.02 X10(3)/MCL (ref 0–0.04)
IMM GRANULOCYTES NFR BLD AUTO: 0.3 %
LIPASE SERPL-CCNC: 9 U/L
LYMPHOCYTES # BLD AUTO: 2.37 X10(3)/MCL (ref 0.6–4.6)
LYMPHOCYTES NFR BLD AUTO: 30 %
MCH RBC QN AUTO: 29.8 PG (ref 27–31)
MCHC RBC AUTO-ENTMCNC: 31.4 G/DL (ref 33–36)
MCV RBC AUTO: 94.6 FL (ref 80–94)
MONOCYTES # BLD AUTO: 0.43 X10(3)/MCL (ref 0.1–1.3)
MONOCYTES NFR BLD AUTO: 5.4 %
NEUTROPHILS # BLD AUTO: 4.64 X10(3)/MCL (ref 2.1–9.2)
NEUTROPHILS NFR BLD AUTO: 58.8 %
NRBC BLD AUTO-RTO: 0 %
PLATELET # BLD AUTO: 242 X10(3)/MCL (ref 130–400)
PMV BLD AUTO: 9.3 FL (ref 7.4–10.4)
POTASSIUM SERPL-SCNC: 3.8 MMOL/L (ref 3.5–5.1)
PROT SERPL-MCNC: 7.2 GM/DL (ref 6.4–8.3)
RBC # BLD AUTO: 4.84 X10(6)/MCL (ref 4.7–6.1)
SODIUM SERPL-SCNC: 141 MMOL/L (ref 136–145)
WBC # SPEC AUTO: 7.9 X10(3)/MCL (ref 4.5–11.5)

## 2023-11-08 PROCEDURE — 85025 COMPLETE CBC W/AUTO DIFF WBC: CPT | Performed by: STUDENT IN AN ORGANIZED HEALTH CARE EDUCATION/TRAINING PROGRAM

## 2023-11-08 PROCEDURE — 83690 ASSAY OF LIPASE: CPT | Performed by: STUDENT IN AN ORGANIZED HEALTH CARE EDUCATION/TRAINING PROGRAM

## 2023-11-08 PROCEDURE — 99285 EMERGENCY DEPT VISIT HI MDM: CPT | Mod: 25,27

## 2023-11-08 PROCEDURE — 99283 EMERGENCY DEPT VISIT LOW MDM: CPT | Mod: 25

## 2023-11-08 PROCEDURE — 25500020 PHARM REV CODE 255

## 2023-11-08 PROCEDURE — 80053 COMPREHEN METABOLIC PANEL: CPT | Performed by: STUDENT IN AN ORGANIZED HEALTH CARE EDUCATION/TRAINING PROGRAM

## 2023-11-08 RX ADMIN — IOHEXOL 100 ML: 350 INJECTION, SOLUTION INTRAVENOUS at 10:11

## 2023-11-08 NOTE — Clinical Note
"Kobe"Mahendra Sidhu was seen and treated in our emergency department on 11/8/2023.  He may return to work on 11/10/2023.       If you have any questions or concerns, please don't hesitate to call.      Edwar Butler MD"

## 2023-11-09 ENCOUNTER — OFFICE VISIT (OUTPATIENT)
Dept: UROLOGY | Facility: CLINIC | Age: 48
End: 2023-11-09
Attending: STUDENT IN AN ORGANIZED HEALTH CARE EDUCATION/TRAINING PROGRAM
Payer: MEDICAID

## 2023-11-09 VITALS
OXYGEN SATURATION: 99 % | DIASTOLIC BLOOD PRESSURE: 75 MMHG | HEIGHT: 65 IN | SYSTOLIC BLOOD PRESSURE: 112 MMHG | WEIGHT: 161.38 LBS | TEMPERATURE: 98 F | HEART RATE: 62 BPM | RESPIRATION RATE: 20 BRPM | BODY MASS INDEX: 26.89 KG/M2

## 2023-11-09 VITALS
HEIGHT: 65 IN | RESPIRATION RATE: 18 BRPM | WEIGHT: 162.25 LBS | OXYGEN SATURATION: 98 % | HEART RATE: 78 BPM | TEMPERATURE: 98 F | BODY MASS INDEX: 27.03 KG/M2 | SYSTOLIC BLOOD PRESSURE: 106 MMHG | DIASTOLIC BLOOD PRESSURE: 79 MMHG

## 2023-11-09 DIAGNOSIS — N42.83 CYST OF PROSTATE: ICD-10-CM

## 2023-11-09 DIAGNOSIS — A53.9 SYPHILIS: Primary | ICD-10-CM

## 2023-11-09 PROCEDURE — 3008F PR BODY MASS INDEX (BMI) DOCUMENTED: ICD-10-PCS | Mod: CPTII,,, | Performed by: UROLOGY

## 2023-11-09 PROCEDURE — 3078F DIAST BP <80 MM HG: CPT | Mod: CPTII,,, | Performed by: UROLOGY

## 2023-11-09 PROCEDURE — 3074F SYST BP LT 130 MM HG: CPT | Mod: CPTII,,, | Performed by: UROLOGY

## 2023-11-09 PROCEDURE — 3044F HG A1C LEVEL LT 7.0%: CPT | Mod: CPTII,,, | Performed by: UROLOGY

## 2023-11-09 PROCEDURE — 99215 OFFICE O/P EST HI 40 MIN: CPT | Mod: PBBFAC | Performed by: UROLOGY

## 2023-11-09 PROCEDURE — 1159F MED LIST DOCD IN RCRD: CPT | Mod: CPTII,,, | Performed by: UROLOGY

## 2023-11-09 PROCEDURE — 3044F PR MOST RECENT HEMOGLOBIN A1C LEVEL <7.0%: ICD-10-PCS | Mod: CPTII,,, | Performed by: UROLOGY

## 2023-11-09 PROCEDURE — 1160F RVW MEDS BY RX/DR IN RCRD: CPT | Mod: CPTII,,, | Performed by: UROLOGY

## 2023-11-09 PROCEDURE — 1160F PR REVIEW ALL MEDS BY PRESCRIBER/CLIN PHARMACIST DOCUMENTED: ICD-10-PCS | Mod: CPTII,,, | Performed by: UROLOGY

## 2023-11-09 PROCEDURE — 1159F PR MEDICATION LIST DOCUMENTED IN MEDICAL RECORD: ICD-10-PCS | Mod: CPTII,,, | Performed by: UROLOGY

## 2023-11-09 PROCEDURE — 3078F PR MOST RECENT DIASTOLIC BLOOD PRESSURE < 80 MM HG: ICD-10-PCS | Mod: CPTII,,, | Performed by: UROLOGY

## 2023-11-09 PROCEDURE — 99204 PR OFFICE/OUTPT VISIT, NEW, LEVL IV, 45-59 MIN: ICD-10-PCS | Mod: S$PBB,,, | Performed by: UROLOGY

## 2023-11-09 PROCEDURE — 3008F BODY MASS INDEX DOCD: CPT | Mod: CPTII,,, | Performed by: UROLOGY

## 2023-11-09 PROCEDURE — 3074F PR MOST RECENT SYSTOLIC BLOOD PRESSURE < 130 MM HG: ICD-10-PCS | Mod: CPTII,,, | Performed by: UROLOGY

## 2023-11-09 PROCEDURE — 99204 OFFICE O/P NEW MOD 45 MIN: CPT | Mod: S$PBB,,, | Performed by: UROLOGY

## 2023-11-09 NOTE — ED PROVIDER NOTES
Encounter Date: 2023       History     Chief Complaint   Patient presents with    Abdominal Pain     Lower abdominal discomfort, nausea; feels like it may pass if he goes to the restroom. Very recent discharge with hernia.     Patient presents to the emergency department due to abdominal pain.  He was just discharged from the ER, he has a known left inguinal hernia that has been there for months, it was not causing him any pain or discomfort, and he had no abdominal pain, vomiting or constipation at that time, and hernia was easily reduced.  Returns because after walking outside he was sudden severe lower abdominal cramping.  States it feels like he needs to have a bowel movement.  States he also got dizzy and sweaty.  Now some of his symptoms have improved but he still has a abdominal discomfort.    The history is provided by the patient.     Review of patient's allergies indicates:  No Known Allergies  No past medical history on file.  Past Surgical History:   Procedure Laterality Date    APPENDECTOMY       Family History   Problem Relation Age of Onset    Cancer Mother     Cancer Maternal Grandmother      Social History     Tobacco Use    Smoking status: Former     Current packs/day: 0.00     Average packs/day: 0.3 packs/day for 1.5 years (0.4 ttl pk-yrs)     Types: Cigarettes     Start date: 2019     Quit date:      Years since quittin.8    Smokeless tobacco: Never   Substance Use Topics    Alcohol use: Yes     Alcohol/week: 2.0 standard drinks of alcohol     Types: 2 Cans of beer per week     Comment: only for football    Drug use: Never     Review of Systems   Constitutional:  Negative for chills and fever.   HENT:  Negative for congestion and sore throat.    Respiratory:  Negative for cough and shortness of breath.    Cardiovascular:  Negative for chest pain and palpitations.   Gastrointestinal:  Positive for abdominal pain. Negative for nausea.   Genitourinary:  Negative for dysuria and  hematuria.   Musculoskeletal:  Negative for arthralgias and myalgias.   Neurological:  Negative for dizziness and weakness.       Physical Exam     Initial Vitals [11/08/23 2154]   BP Pulse Resp Temp SpO2   114/73 63 16 97.3 °F (36.3 °C) 100 %      MAP       --         Physical Exam    Nursing note and vitals reviewed.  Constitutional: He appears well-developed and well-nourished.   HENT:   Head: Normocephalic and atraumatic.   Eyes: Conjunctivae are normal. Pupils are equal, round, and reactive to light.   Neck: Neck supple.   Normal range of motion.  Cardiovascular:  Normal rate and regular rhythm.           Pulmonary/Chest: Breath sounds normal. No respiratory distress.   Abdominal: Abdomen is soft. He exhibits no distension. There is abdominal tenderness (bilateral lower quadrant abdominal tenderness).   Left inguinal hernia, soft, no erythema, easily reduced.  No tenderness There is no rebound.   Musculoskeletal:         General: No edema. Normal range of motion.      Cervical back: Normal range of motion and neck supple.     Neurological: He is alert and oriented to person, place, and time.   Skin: Skin is warm and dry.         ED Course   Procedures  Labs Reviewed   COMPREHENSIVE METABOLIC PANEL - Abnormal; Notable for the following components:       Result Value    Glucose Level 72 (*)     Globulin 3.6 (*)     Albumin/Globulin Ratio 1.0 (*)     All other components within normal limits   LIPASE - Normal          Imaging Results              CT Abdomen Pelvis With IV Contrast (Preliminary result)  Result time 11/08/23 23:45:45      Preliminary result by Von Mendenhall Jr., MD (11/08/23 23:45:45)                   Narrative:    START OF REPORT:  Technique: CT of the abdomen and pelvis was performed with axial images as well as sagittal and coronal reconstruction images with intravenous contrast.    Comparison: None available.    Clinical History: L inguinal hernia, diffuse abd pain.    Dosage  Information: Automated Exposure Control was utilized.    Findings:  Lines and Tubes: None.  Thorax:  Lungs: The visualized lung bases appear unremarkable. No focal infiltrate or consolidation is seen.  Pleura: No effusions or thickening. No pneumothorax is seen.  Heart: The heart size is within normal limits.  Abdomen:  Abdominal Wall: No abdominal wall pathology is seen.  Liver: The liver appears unremarkable.  Biliary System: No intrahepatic or extrahepatic biliary duct dilatation is seen.  Gallbladder: The gallbladder appears unremarkable.  Pancreas: The pancreas appears unremarkable.  Spleen: The spleen appears unremarkable.  Adrenals: The adrenal glands appear unremarkable.  Kidneys: The kidneys appear unremarkable with no stones cysts masses or hydronephrosis.  Bowel:  Esophagus: The visualized esophagus appears unremarkable.  Stomach: The stomach appears unremarkable.  Duodenum: Unremarkable appearing duodenum.  Small Bowel: The small bowel appears unremarkable.  Colon: Nondistended.  Appendix: The appendix appears unremarkable partially seen on Image 44, Series 6.  Peritoneum: No intraperitoneal free air or ascites is seen.    Pelvis:  Bladder: The bladder appears unremarkable.  Male:  Prostate gland: There is a 3.9 x 4.2 x 3.9 cm cystic lesion seen within the prostate on series 2 image 123 and series 8 image 49. The diagnostic considerations include simple cyst versus an abscess. Correlate with clinical and laboratory findings further evaluation with ultrasound.  Inguinal Findings:  Inguinal Hernia: Incidental note is made of small uncomplicated mesenteric fat containing left inguinal hernia.    Bony structures:  Dorsal Spine: The visualized dorsal spine appears unremarkable.  Bony Pelvis: The visualized bony structures of the pelvis appear unremarkable.      Impression:  1. There is a 3.9 x 4.2 x 3.9 cm cystic lesion seen within the prostate on series 2 image 123 and series 8 image 49. The diagnostic  considerations include simple cyst versus an abscess. Correlate with clinical and laboratory findings further evaluation with ultrasound.  2. Details and findings as discussed.                                         Medications   iohexoL (OMNIPAQUE 350) 350 mg iodine/mL injection (100 mLs Intravenous Given 11/8/23 2230)     Medical Decision Making  Patient presents to the emergency department due to abdominal pain.  He was just discharged from the ER, he has a known left inguinal hernia that has been there for months, it was not causing him any pain or discomfort, and he had no abdominal pain, vomiting or constipation at that time, and hernia was easily reduced.  Returns because after walking outside he was sudden severe lower abdominal cramping.  States it feels like he needs to have a bowel movement.  States he also got dizzy and sweaty.  Now some of his symptoms have improved but he still has a abdominal discomfort.    Chronic left inguinal hernia without incarceration.  CT abdomen and pelvis done which showed a cystic lesion seen in the prostate.  He denies fever, dysuria, weak stream, hematuria.    Urgent referral sent to urology clinic for further evaluation follow up.  He has a pending General surgery referral for inguinal hernia follow up.  Patient in no pain & is very sleepy at time of discharge.  Ready for discharge    The patient is resting comfortably and in no acute distress.  He states that his symptoms have improved after treatment in Emergency Department. I personally discussed his test results and treatment plan.  Gave strict ED precautions and specific conditions for return to the emergency department and importance of follow up with pcp, General surgery and Urology..  Patient voices understanding and agrees to the plan discussed. All patients' questions have been answered at this time. He has remained hemodynamically stable throughout entire stay in ED and is stable for discharge home.     Amount  and/or Complexity of Data Reviewed  Labs: ordered.  Radiology: ordered. Decision-making details documented in ED Course.               ED Course as of 11/09/23 0114   Wed Nov 08, 2023   2251 Handoff to LEN Stark with CT pending [AW]   Thu Nov 09, 2023   0030 CT Abdomen Pelvis With IV Contrast  There is a 3.9 x 4.2 x 3.9 cm cystic lesion seen within the prostate on series 2 image 123 and series 8 image 49. The diagnostic considerations include simple cyst versus an abscess. Correlate with clinical and laboratory findings further evaluation with ultrasound. [ER]      ED Course User Index  [AW] Edwar Butler MD  [ER] Madison Bang PA                      Clinical Impression:   Final diagnoses:  [N42.83] Cyst of prostate (Primary)  [K40.90] Left inguinal hernia        ED Disposition Condition    Discharge Stable          ED Prescriptions    None       Follow-up Information       Follow up With Specialties Details Why Contact Info    [facility] - Emergency Dept Emergency Medicine  As needed, If symptoms worsen Pending sale to [facility] [address] [phone]    Rissa Leal, AZALIAP Nurse Practitioner Schedule an appointment as soon as possible for a visit in 2 days  Pending sale to [facility] [address]  [phone]      [facility] - Urology Urology  They will call you to schedule an apt. Pending sale to [facility] [id number] [phone]    [facility] - General Surgery Services General Surgery  They will call you to schedule an apointment [address] [facility] [phone]             Madison Bang PA  11/09/23 0114

## 2023-11-09 NOTE — DISCHARGE INSTRUCTIONS
Referral sent to urology for further evaluation of cystic lesion and prostate.  They will call you to schedule an appointment.  Referral was sent to General surgery for further evaluation of inguinal hernia.  Follow up with the primary care provider within 2-3 days.  Return with any concerning symptoms.

## 2023-11-09 NOTE — DISCHARGE INSTRUCTIONS
A referral has been placed for General surgery, they should be calling you soon to set up an appointment for further evaluation of your hernia.  If your hernia starts to become very painful or you have nausea, vomiting or constipation, please return to the ER for further evaluation.

## 2023-11-09 NOTE — PROGRESS NOTES
CC:  Prostate cyst    HPI:  Kobe Sidhu is a 48 y.o. male being seen in consultation for prostate cyst.  He was having abdominal pain and went to the emergency room yesterday.  Was diagnosed with a left hernia and after reducing the hernia was sent out and a referral was generated to General surgery.  As he was leaving he began having severe abdominal pain and returned to the emergency room.  They then got a CT scan which showed a prostatic cyst.  He has no urinary complaints.  PSA was normal in April of 2023.  He was also found to have syphilis in January of this year and was treated for that.  He has missed several appointments for follow-up with his primary care.  He has questions about whether this has resolved.    Urinalysis:  No results found for this or any previous visit.  Microscopic:  per HPF    Negative      Lab Results:  PSA History:     Latest Reference Range & Units 04/27/23 08:17   Prostate Specific Antigen <=4.00 ng/mL 0.78     Imaging:  CT - 8 November 2023:   The prostate is slightly enlarged in size.  There is a cystic lesion in the prostate that measures 3.9 x 4.2 x 3.9 cm.  It is poorly evaluated on this examination due to the arterial phase of the scan.    Data Review:  Note from referring provider, Edwar Butler MD dated nine November 2023.  CT.     ROS:  All systems reviewed and are negative except as documented in HPI and/or Assessment and Plan.     Patient Active Problem List:     Patient Active Problem List   Diagnosis    Rotator cuff injury, right, sequela    Mixed hyperlipidemia    Prediabetes    Syphilis        Past Medical History:  Past Medical History:   Diagnosis Date    Mixed hyperlipidemia         Past Surgical History:  Past Surgical History:   Procedure Laterality Date    APPENDECTOMY  1984        Family History:  Family History   Problem Relation Age of Onset    Cancer Mother     Cancer Maternal Grandmother         Social History:  Social History     Socioeconomic History     Marital status: Single   Occupational History    Occupation: cuts grass   Tobacco Use    Smoking status: Former     Current packs/day: 0.00     Average packs/day: 0.3 packs/day for 1.5 years (0.4 ttl pk-yrs)     Types: Cigarettes     Start date: 2019     Quit date:      Years since quittin.8    Smokeless tobacco: Never   Substance and Sexual Activity    Alcohol use: Yes     Alcohol/week: 2.0 standard drinks of alcohol     Types: 2 Cans of beer per week     Comment: only for football    Drug use: Never    Sexual activity: Yes     Partners: Female     Social Determinants of Health     Financial Resource Strain: Low Risk  (2023)    Overall Financial Resource Strain (CARDIA)     Difficulty of Paying Living Expenses: Not hard at all   Food Insecurity: No Food Insecurity (2023)    Hunger Vital Sign     Worried About Running Out of Food in the Last Year: Never true     Ran Out of Food in the Last Year: Never true   Transportation Needs: No Transportation Needs (2023)    PRAPARE - Transportation     Lack of Transportation (Medical): No     Lack of Transportation (Non-Medical): No   Physical Activity: Insufficiently Active (2022)    Exercise Vital Sign     Days of Exercise per Week: 3 days     Minutes of Exercise per Session: 30 min   Stress: No Stress Concern Present (2023)    [employer] [place] [employer] - Occupational Stress Questionnaire     Feeling of Stress : Not at all   Social Connections: Moderately Integrated (2023)    Social Connection and Isolation Panel [NHANES]     Frequency of Communication with Friends and Family: More than three times a week     Frequency of Social Gatherings with Friends and Family: More than three times a week     Attends Gnosticist Services: More than 4 times per year     Active Member of Clubs or Organizations: Yes     Attends Club or Organization Meetings: More than 4 times per year     Marital Status:    Housing Stability:  Unknown (1/24/2023)    Housing Stability Vital Sign     Unable to Pay for Housing in the Last Year: No     Unstable Housing in the Last Year: No        Allergies:  Review of patient's allergies indicates:  No Known Allergies     Objective:  Vitals:    11/09/23 1537   BP: 112/75   Pulse: 62   Resp: 20   Temp: 97.7 °F (36.5 °C)     General:  Well developed, well nourished adult male in no acute distress  Abdomen: Soft, nontender, no masses  Extremities:  No clubbing, cyanosis, or edema  Neurologic:  Grossly intact  Musculoskeletal:  Normal tone  Penis:  Circumcised, no lesions  Scrotum:  No hydrocele  Testicles:  Nontender, no masses  Epididymis:  Normal without masses  Prostate exam:  Prostate is normal in the apex and then enlarges in the mid to base region.  There is no palpable nodule present.  Rectal:  Normal rectal tone    Assessment:  1. Cyst of prostate  - Ambulatory referral/consult to Urology  - POCT URINE DIPSTICK WITH MICROSCOPE, AUTOMATED  - MRI Prostate W W/O Contrast; Future    2. Syphilis     Plan:  We will get an MRI of the prostate to completely evaluate this prostatic cyst.  I encouraged him to get an appointment with his primary care to follow-up his treatment syphilis.    Follow-up:  After MRI.

## 2023-11-09 NOTE — PROGRESS NOTES
Placed in room. To be seen by Dr. Mcmahon. D/C instructions given and understood. RTC after MRI.

## 2023-11-09 NOTE — ED NOTES
Pt given discharge instructions Instructed to follow up with pcp and return to er if any complications.

## 2023-11-09 NOTE — ED PROVIDER NOTES
Encounter Date: 2023       History     Chief Complaint   Patient presents with    left groin  discomfort, possible hernia     Patient presents to the emergency department for left inguinal hernia.  He was had this for a month, he was seen in his primary care physician at [facility] for it, he was told he needs to see a general surgeon but has not scheduled an appointment yet.  He states it was not bothering him more than normal tonight, he just wants to get this taken care of.  He has no pain, no nausea or vomiting or constipation.    The history is provided by the patient.     Review of patient's allergies indicates:  No Known Allergies  No past medical history on file.  Past Surgical History:   Procedure Laterality Date    APPENDECTOMY       Family History   Problem Relation Age of Onset    Cancer Mother     Cancer Maternal Grandmother      Social History     Tobacco Use    Smoking status: Former     Current packs/day: 0.00     Average packs/day: 0.3 packs/day for 1.5 years (0.4 ttl pk-yrs)     Types: Cigarettes     Start date: 2019     Quit date:      Years since quittin.8    Smokeless tobacco: Never   Substance Use Topics    Alcohol use: Yes     Alcohol/week: 2.0 standard drinks of alcohol     Types: 2 Cans of beer per week     Comment: only for football    Drug use: Never     Review of Systems   Constitutional:  Negative for chills and fever.   HENT:  Negative for congestion and sore throat.    Respiratory:  Negative for cough and shortness of breath.    Cardiovascular:  Negative for chest pain and palpitations.   Gastrointestinal:  Negative for abdominal pain and nausea.   Genitourinary:  Negative for dysuria and hematuria.   Musculoskeletal:  Negative for arthralgias and myalgias.   Neurological:  Negative for dizziness and weakness.       Physical Exam     Initial Vitals [23]   BP Pulse Resp Temp SpO2   113/75 62 20 97.7 °F (36.5 °C) 99 %      MAP       --         Physical  Exam    Nursing note and vitals reviewed.  Constitutional: He appears well-developed and well-nourished.   HENT:   Head: Normocephalic and atraumatic.   Eyes: Conjunctivae are normal. Pupils are equal, round, and reactive to light.   Neck: Neck supple.   Normal range of motion.  Cardiovascular:  Normal rate and regular rhythm.           Pulmonary/Chest: Breath sounds normal. No respiratory distress.   Abdominal: Abdomen is soft. There is no abdominal tenderness.   Genitourinary:    Genitourinary Comments: Left inguinal hernia, no overlying skin changes, nontender, easily reduced     Musculoskeletal:         General: No edema. Normal range of motion.      Cervical back: Normal range of motion and neck supple.     Neurological: He is alert and oriented to person, place, and time.   Skin: Skin is warm and dry.         ED Course   Procedures  Labs Reviewed   CBC WITH DIFFERENTIAL - Abnormal; Notable for the following components:       Result Value    MCV 94.6 (*)     MCHC 31.4 (*)     All other components within normal limits   CBC W/ AUTO DIFFERENTIAL    Narrative:     The following orders were created for panel order CBC auto differential.  Procedure                               Abnormality         Status                     ---------                               -----------         ------                     CBC with Differential[[id number]]        Abnormal            Final result                 Please view results for these tests on the individual orders.          Imaging Results    None          Medications - No data to display  Medical Decision Making  Vital signs are stable.  Hernia easily reduced at bedside, patient was asymptomatic at this point.  Will discharge and refer to General surgery and given strict return precautions.    Amount and/or Complexity of Data Reviewed  Labs: ordered.                               Clinical Impression:   Final diagnoses:  [K40.90] Left inguinal hernia (Primary)        ED  Disposition Condition    Discharge Stable          ED Prescriptions    None       Follow-up Information       Follow up With Specialties Details Why Contact Info    [facility] - Emergency Dept Emergency Medicine Go to  If symptoms worsen [address] [facility] [address] [phone]             Edwar Butler MD  11/08/23 1156

## 2023-11-09 NOTE — ED NOTES
Parent given discharge instructions Instructed to follow up with pcp and return to er if any complications.

## 2023-11-21 ENCOUNTER — OFFICE VISIT (OUTPATIENT)
Dept: SURGERY | Facility: CLINIC | Age: 48
End: 2023-11-21
Payer: MEDICAID

## 2023-11-21 VITALS
HEART RATE: 60 BPM | WEIGHT: 169 LBS | BODY MASS INDEX: 28.16 KG/M2 | RESPIRATION RATE: 18 BRPM | DIASTOLIC BLOOD PRESSURE: 79 MMHG | TEMPERATURE: 98 F | OXYGEN SATURATION: 97 % | SYSTOLIC BLOOD PRESSURE: 116 MMHG | HEIGHT: 65 IN

## 2023-11-21 DIAGNOSIS — K40.90 LEFT INGUINAL HERNIA: ICD-10-CM

## 2023-11-21 PROCEDURE — 99215 OFFICE O/P EST HI 40 MIN: CPT | Mod: PBBFAC

## 2023-11-21 NOTE — PROGRESS NOTES
Pt seen by Dr. Murphy; Pt instructed to return to clinic in 3 weeks after MRI; Discharge paperwork given w/pt verbalizing understanding

## 2023-11-21 NOTE — PROGRESS NOTES
I have reviewed the notes, assessments, and/or procedures performed by the resident, I concur with her/his documentation of Kobe Sidhu.     Glenda Weiss MD

## 2023-11-21 NOTE — PROGRESS NOTES
[facility] Clinic Note    HPI: Kobe Sidhu is a 47 yo male with a PMHx of syphilis presenting to clinic today for evaluation of L inguinal hernia which has been present for 6 months. He notes no increase in size of hernia since he first noticed it. Patient was recently seen in ED for acute abdominal pain without nausea or vomiting. Workup in ED included abdominal CT significant for L inguinal hernia and prostate mass. Patient was discharged to follow up with Urology and General Surgery. Urology following, patient scheduled for MRI of prostate 12/5.   Patient denies n/v, abdominal pain, changes in stool consistency or caliber, melena, BRPR, or problems urinating or halving a bowel movement.   He reports that he is required to lift heavy things for his job.     Patient reports social drinking and tobaco smoking.     PMH:   Past Medical History:   Diagnosis Date    Mixed hyperlipidemia       Meds:   Current Outpatient Medications:     aspirin-caffeine (DESIRAE BACK AND BODY) 500-32.5 mg Tab, Take by mouth., Disp: , Rfl:     atorvastatin (LIPITOR) 40 MG tablet, Take 1 tablet (40 mg total) by mouth every evening. For High Cholesterol, Disp: 30 tablet, Rfl: 3    baclofen (LIORESAL) 10 MG tablet, Take 1 tablet (10 mg total) by mouth 3 (three) times daily as needed (body aches)., Disp: 30 tablet, Rfl: 0    diclofenac sodium (VOLTAREN) 1 % Gel, Apply 2 g topically 2 (two) times daily as needed. For lower extremity joints, apply 4 g on joint up to 4x a day.  For upper extremity joints, apply 2 g on joint up to 4x a day.  Do not use this at the same time as taking an oral NSAID such as Diclofenac, Ibuprofen, Mobic, Advil, Aleve, Naproxen etc.  However you can alternate this topical NSAID with oral NSAID.   You can take Tylenol while using this medication. (Patient not taking: Reported on 11/9/2023), Disp: 200 g, Rfl: 0  Allergies: Review of patient's allergies indicates:  No Known Allergies  Social History:   Social  History     Tobacco Use    Smoking status: Former     Current packs/day: 0.00     Average packs/day: 0.3 packs/day for 1.5 years (0.4 ttl pk-yrs)     Types: Cigarettes     Start date: 2019     Quit date:      Years since quittin.8    Smokeless tobacco: Never   Substance Use Topics    Alcohol use: Yes     Alcohol/week: 2.0 standard drinks of alcohol     Types: 2 Cans of beer per week     Comment: only for football    Drug use: Never     Family History:   Family History   Problem Relation Age of Onset    Cancer Mother     Cancer Maternal Grandmother      Surgical History:   Past Surgical History:   Procedure Laterality Date    APPENDECTOMY       Review of Systems:  Deferred except as above     Objective:    Vitals:  Vitals:    23 1012   BP: 116/79   Pulse: 60   Resp: 18   Temp: 97.7 °F (36.5 °C)        Physical Exam:  Gen: NAD  Neuro: awake, alert, answering questions appropriately  CV: RRR  Resp: non-labored breathing, EUGENE  Abd: soft, ND, NT  : L reducible inguinal hernia without evidence of strangulation of incarceration   Ext: moves all 4 spontaneously and purposefully  Skin: warm, well perfused    Pertinent Labs:  None     Imaging:    CT   The lung bases are clear.     The liver appears normal.  No liver mass or lesion is seen.  Portal and hepatic veins appear normal.     The gallbladder appears normal.  No obvious gallstones are seen.  No biliary dilatation is seen.  No pericholecystic fluid is seen.     The pancreas appears normal.  No pancreatic mass or lesion is seen.     The spleen shows no acute abnormality.     The adrenal glands appear normal.  No adrenal nodule is seen.     The kidneys appear normal.  No hydronephrosis is seen.  No hydroureter is seen.  No nephrolithiasis is seen.  No obvious ureteral stones are seen.     The urinary bladder is normal in appearance.     The prostate is slightly enlarged in size.  There is a cystic lesion in the prostate that measures 3.9 x 4.2  x 3.9 cm.  It is poorly evaluated on this examination due to the arterial phase of the scan.     There is a left-sided inguinal hernia contains some fat.  No obstruction is seen..     No colitis is seen.  No diverticulitis is seen.  No obvious colonic mass or lesion is seen.     No free air is seen.  No free fluid is seen.     Impression:     Enlarged prostate with a large hypoattenuated cystic area within the prostate.  There is poorly evaluated on this examination due to the timing of the bolus.  Differential diagnosis could include simple cyst versus abscess.  Correlation clinical laboratory findings are recommended.  Correlation ultrasound may be beneficial.    Micro/Path/Other:  None     Assessment/Plan:   Kobe Sidhu is a 47 yo male with a PMHx of syphilis with non strangulated, reducible L inguinal hernia which has been present for 6 months. Urology is currently evaluating patient for prostatic cyst vs abscess noted on CT 11/08. Discussed with patient that given no symptoms of obstruction, strangulation, or incarceration, we will reevaluate for surgical repair of hernia after prostate mass has been worked up.       - Return to clinic for follow up 12/19   - Patient instructed to go to ED should symptoms of intense abdominal pain, nausea, or vomiting arise     Lynda Becerra, MS3    PGY3 Addendum:   I have read and agree with the note/documentation as above. I have made edits and addendums as necessary.     Alba Murphy  [facility], PGY3

## 2023-11-21 NOTE — LETTER
November 21, 2023    Kobe Sidhu  [address] [place] [address]         [facility]  [address] [place] [address]  Phone: [phone] November 21, 2023     Patient: Kobe Sidhu   YOB: 1975   Date of Visit: 11/21/2023       To Whom It May Concern:    It is my medical opinion that Kobe Sidhu  okay to work without heavy lifting greater than 30 lbs .    If you have any questions or concerns, please don't hesitate to call.    Sincerely,        Alba Murphy MD

## 2023-12-05 ENCOUNTER — HOSPITAL ENCOUNTER (OUTPATIENT)
Dept: RADIOLOGY | Facility: HOSPITAL | Age: 48
Discharge: HOME | End: 2023-12-05
Attending: UROLOGY
Payer: MEDICAID

## 2023-12-05 DIAGNOSIS — N42.83 CYST OF PROSTATE: ICD-10-CM

## 2023-12-05 PROCEDURE — 72197 MRI PELVIS W/O & W/DYE: CPT | Mod: TC

## 2023-12-05 PROCEDURE — A9577 INJ MULTIHANCE: HCPCS

## 2023-12-05 PROCEDURE — 25500020 PHARM REV CODE 255

## 2023-12-05 RX ADMIN — GADOBENATE DIMEGLUMINE 16 ML: 529 INJECTION, SOLUTION INTRAVENOUS at 11:12

## 2023-12-19 ENCOUNTER — TELEPHONE (OUTPATIENT)
Dept: INTERNAL MEDICINE | Facility: CLINIC | Age: 48
End: 2023-12-19
Payer: MEDICAID

## 2023-12-19 ENCOUNTER — OFFICE VISIT (OUTPATIENT)
Dept: SURGERY | Facility: CLINIC | Age: 48
End: 2023-12-19
Payer: MEDICAID

## 2023-12-19 VITALS
TEMPERATURE: 98 F | HEIGHT: 65 IN | SYSTOLIC BLOOD PRESSURE: 114 MMHG | HEART RATE: 66 BPM | OXYGEN SATURATION: 100 % | BODY MASS INDEX: 27.49 KG/M2 | WEIGHT: 165 LBS | RESPIRATION RATE: 19 BRPM | DIASTOLIC BLOOD PRESSURE: 72 MMHG

## 2023-12-19 DIAGNOSIS — K40.90 LEFT INGUINAL HERNIA: Primary | ICD-10-CM

## 2023-12-19 DIAGNOSIS — Z12.11 ENCOUNTER FOR SCREENING COLONOSCOPY: ICD-10-CM

## 2023-12-19 PROCEDURE — 99215 OFFICE O/P EST HI 40 MIN: CPT | Mod: PBBFAC

## 2023-12-19 RX ORDER — SODIUM CHLORIDE 9 MG/ML
INJECTION, SOLUTION INTRAVENOUS CONTINUOUS
Status: CANCELLED | OUTPATIENT
Start: 2023-12-19

## 2023-12-19 RX ORDER — HEPARIN SODIUM 5000 [USP'U]/ML
5000 INJECTION, SOLUTION INTRAVENOUS; SUBCUTANEOUS EVERY 8 HOURS
Status: CANCELLED | OUTPATIENT
Start: 2023-12-19

## 2023-12-19 NOTE — TELEPHONE ENCOUNTER
Bunny    The patient received 3 Bicillin Injections:  01/24/23 02/01/23 02/08/23  Which would be a completed series.    Thanks,    Rissa Leal, KERA

## 2023-12-19 NOTE — PROGRESS NOTES
Patient seen by Dr. Reddy. Patient instructed to RTC 2 weeks for post op after surgery on January 3rd. Patient will be referred to GI for colonoscopy as well.

## 2023-12-19 NOTE — H&P (VIEW-ONLY)
[facility] General Surgery Clinic Note    HPI: Kobe Sidhu is a 49 yo M with history of syphilis and HLD who presents for follow up of left inguinal hernia. He was last seen on  and was sent for evaluation of prostate cyst prior to scheduling his hernia repair. He denies any issues with incarceration, skin changes with his hernia since that time. He is having regular bowel movements without issues. He is smoking 2 cigarettes a day. He does report 10 lb weight loss recently but denies any hematochezia or melena. Denies any other masses.     PMH:   Past Medical History:   Diagnosis Date    Mixed hyperlipidemia    Syphilis      Meds:   Current Outpatient Medications:     aspirin-caffeine (DESIRAE BACK AND BODY) 500-32.5 mg Tab, Take by mouth., Disp: , Rfl:     atorvastatin (LIPITOR) 40 MG tablet, Take 1 tablet (40 mg total) by mouth every evening. For High Cholesterol, Disp: 30 tablet, Rfl: 3    baclofen (LIORESAL) 10 MG tablet, Take 1 tablet (10 mg total) by mouth 3 (three) times daily as needed (body aches)., Disp: 30 tablet, Rfl: 0    diclofenac sodium (VOLTAREN) 1 % Gel, Apply 2 g topically 2 (two) times daily as needed. For lower extremity joints, apply 4 g on joint up to 4x a day.  For upper extremity joints, apply 2 g on joint up to 4x a day.  Do not use this at the same time as taking an oral NSAID such as Diclofenac, Ibuprofen, Mobic, Advil, Aleve, Naproxen etc.  However you can alternate this topical NSAID with oral NSAID.   You can take Tylenol while using this medication. (Patient not taking: Reported on 2023), Disp: 200 g, Rfl: 0    Allergies: Review of patient's allergies indicates:  No Known Allergies    Social History:   Social History     Tobacco Use    Smoking status: Former     Current packs/day: 0.00     Average packs/day: 0.3 packs/day for 1.5 years (0.4 ttl pk-yrs)     Types: Cigarettes     Start date: 2019     Quit date:      Years since quittin.9    Smokeless tobacco: Never    Substance Use Topics    Alcohol use: Yes     Alcohol/week: 2.0 standard drinks of alcohol     Types: 2 Cans of beer per week     Comment: only for football    Drug use: Never     Family History:   Family History   Problem Relation Age of Onset    Cancer Mother     Cancer Maternal Grandmother      Surgical History:   Past Surgical History:   Procedure Laterality Date    APPENDECTOMY  1984     Review of Systems:  Negative except as mentioned in HPI     Objective:    Vitals:  Vitals:    12/19/23 1103   BP: 114/72   Pulse: 66   Resp: 19   Temp: 97.5 °F (36.4 °C)        Physical Exam:  Gen: NAD  Neuro: awake, alert, answering questions appropriately  CV: RRR, 2+ radial pulses   Resp: non-labored breathing, EUGENE  Abd: soft, ND, NT  : reducible left inguinal hernia. No right inguinal hernia   Ext: moves all 4 spontaneously and purposefully  Skin: warm, well perfused    Pertinent Labs:  1/11/23   RPR reactive   RPR titer 1:32   HIV nonreactive    Imaging:  CT A/P: The lung bases are clear.     The liver appears normal.  No liver mass or lesion is seen.  Portal and hepatic veins appear normal.     The gallbladder appears normal.  No obvious gallstones are seen.  No biliary dilatation is seen.  No pericholecystic fluid is seen.     The pancreas appears normal.  No pancreatic mass or lesion is seen.     The spleen shows no acute abnormality.     The adrenal glands appear normal.  No adrenal nodule is seen.     The kidneys appear normal.  No hydronephrosis is seen.  No hydroureter is seen.  No nephrolithiasis is seen.  No obvious ureteral stones are seen.     The urinary bladder is normal in appearance.     The prostate is slightly enlarged in size.  There is a cystic lesion in the prostate that measures 3.9 x 4.2 x 3.9 cm.  It is poorly evaluated on this examination due to the arterial phase of the scan.     There is a left-sided inguinal hernia contains some fat.  No obstruction is seen..     No colitis is seen.  No  diverticulitis is seen.  No obvious colonic mass or lesion is seen.     No free air is seen.  No free fluid is seen.     Impression:     Enlarged prostate with a large hypoattenuated cystic area within the prostate.  There is poorly evaluated on this examination due to the timing of the bolus.  Differential diagnosis could include simple cyst versus abscess.  Correlation clinical laboratory findings are recommended.  Correlation ultrasound may be beneficial.    Micro/Path/Other:  None    Assessment/Plan:  Kobe Sidhu is a 47 yo M with history of syphilis and HLD who presents for follow up of left inguinal hernia.      - Per ID notes patient has received treatment for syphilis but unclear based on most recent labs whether he has completed treatment.   - Will tentatively schedule patient for left inguinal hernia repair on January 3rd but discussed with patient that if he still requires additional treatment for his syphilis we will reschedule his surgery   - Will reach out to ID  - Will also refer patient to GI for screening colonoscopy given age >45  - Counseled patient on smoking cessation      Bunny Reddy MD   [facility] PGY 3  12/19/2023 12:04 PM

## 2023-12-19 NOTE — TELEPHONE ENCOUNTER
----- Message from Bunny Reddy MD sent at 12/19/2023 11:30 AM CST -----  Regarding: Min[facility] patient question  Hello,     I am one of the general surgery residents and am seeing a mutual patient Mr. Sidhu. He has been seen by you for treatment of syphilis previously and we are evaluating his inguinal hernia. We would like him to be finished with his treatment of his syphilis before scheduling surgery and I couldn't tell from his labs if he was considered treated or not? Just wanted to reach out and see your thoughts!    Thank you!  Bunny Reddy

## 2023-12-19 NOTE — PROGRESS NOTES
[facility] General Surgery Clinic Note    HPI: Kobe Sidhu is a 49 yo M with history of syphilis and HLD who presents for follow up of left inguinal hernia. He was last seen on  and was sent for evaluation of prostate cyst prior to scheduling his hernia repair. He denies any issues with incarceration, skin changes with his hernia since that time. He is having regular bowel movements without issues. He is smoking 2 cigarettes a day. He does report 10 lb weight loss recently but denies any hematochezia or melena. Denies any other masses.     PMH:   Past Medical History:   Diagnosis Date    Mixed hyperlipidemia    Syphilis      Meds:   Current Outpatient Medications:     aspirin-caffeine (DESIRAE BACK AND BODY) 500-32.5 mg Tab, Take by mouth., Disp: , Rfl:     atorvastatin (LIPITOR) 40 MG tablet, Take 1 tablet (40 mg total) by mouth every evening. For High Cholesterol, Disp: 30 tablet, Rfl: 3    baclofen (LIORESAL) 10 MG tablet, Take 1 tablet (10 mg total) by mouth 3 (three) times daily as needed (body aches)., Disp: 30 tablet, Rfl: 0    diclofenac sodium (VOLTAREN) 1 % Gel, Apply 2 g topically 2 (two) times daily as needed. For lower extremity joints, apply 4 g on joint up to 4x a day.  For upper extremity joints, apply 2 g on joint up to 4x a day.  Do not use this at the same time as taking an oral NSAID such as Diclofenac, Ibuprofen, Mobic, Advil, Aleve, Naproxen etc.  However you can alternate this topical NSAID with oral NSAID.   You can take Tylenol while using this medication. (Patient not taking: Reported on 2023), Disp: 200 g, Rfl: 0    Allergies: Review of patient's allergies indicates:  No Known Allergies    Social History:   Social History     Tobacco Use    Smoking status: Former     Current packs/day: 0.00     Average packs/day: 0.3 packs/day for 1.5 years (0.4 ttl pk-yrs)     Types: Cigarettes     Start date: 2019     Quit date:      Years since quittin.9    Smokeless tobacco: Never    Substance Use Topics    Alcohol use: Yes     Alcohol/week: 2.0 standard drinks of alcohol     Types: 2 Cans of beer per week     Comment: only for football    Drug use: Never     Family History:   Family History   Problem Relation Age of Onset    Cancer Mother     Cancer Maternal Grandmother      Surgical History:   Past Surgical History:   Procedure Laterality Date    APPENDECTOMY  1984     Review of Systems:  Negative except as mentioned in HPI     Objective:    Vitals:  Vitals:    12/19/23 1103   BP: 114/72   Pulse: 66   Resp: 19   Temp: 97.5 °F (36.4 °C)        Physical Exam:  Gen: NAD  Neuro: awake, alert, answering questions appropriately  CV: RRR, 2+ radial pulses   Resp: non-labored breathing, EUGENE  Abd: soft, ND, NT  : reducible left inguinal hernia. No right inguinal hernia   Ext: moves all 4 spontaneously and purposefully  Skin: warm, well perfused    Pertinent Labs:  1/11/23   RPR reactive   RPR titer 1:32   HIV nonreactive    Imaging:  CT A/P: The lung bases are clear.     The liver appears normal.  No liver mass or lesion is seen.  Portal and hepatic veins appear normal.     The gallbladder appears normal.  No obvious gallstones are seen.  No biliary dilatation is seen.  No pericholecystic fluid is seen.     The pancreas appears normal.  No pancreatic mass or lesion is seen.     The spleen shows no acute abnormality.     The adrenal glands appear normal.  No adrenal nodule is seen.     The kidneys appear normal.  No hydronephrosis is seen.  No hydroureter is seen.  No nephrolithiasis is seen.  No obvious ureteral stones are seen.     The urinary bladder is normal in appearance.     The prostate is slightly enlarged in size.  There is a cystic lesion in the prostate that measures 3.9 x 4.2 x 3.9 cm.  It is poorly evaluated on this examination due to the arterial phase of the scan.     There is a left-sided inguinal hernia contains some fat.  No obstruction is seen..     No colitis is seen.  No  diverticulitis is seen.  No obvious colonic mass or lesion is seen.     No free air is seen.  No free fluid is seen.     Impression:     Enlarged prostate with a large hypoattenuated cystic area within the prostate.  There is poorly evaluated on this examination due to the timing of the bolus.  Differential diagnosis could include simple cyst versus abscess.  Correlation clinical laboratory findings are recommended.  Correlation ultrasound may be beneficial.    Micro/Path/Other:  None    Assessment/Plan:  Kobe Sidhu is a 47 yo M with history of syphilis and HLD who presents for follow up of left inguinal hernia.      - Per ID notes patient has received treatment for syphilis but unclear based on most recent labs whether he has completed treatment.   - Will tentatively schedule patient for left inguinal hernia repair on January 3rd but discussed with patient that if he still requires additional treatment for his syphilis we will reschedule his surgery   - Will reach out to ID  - Will also refer patient to GI for screening colonoscopy given age >45  - Counseled patient on smoking cessation      Bunny Reddy MD   [facility] PGY 3  12/19/2023 12:04 PM

## 2023-12-28 ENCOUNTER — OFFICE VISIT (OUTPATIENT)
Dept: UROLOGY | Facility: CLINIC | Age: 48
End: 2023-12-28
Payer: MEDICAID

## 2023-12-28 VITALS
SYSTOLIC BLOOD PRESSURE: 107 MMHG | TEMPERATURE: 98 F | BODY MASS INDEX: 27.1 KG/M2 | DIASTOLIC BLOOD PRESSURE: 73 MMHG | HEART RATE: 63 BPM | RESPIRATION RATE: 20 BRPM | HEIGHT: 65 IN | WEIGHT: 162.63 LBS | OXYGEN SATURATION: 99 %

## 2023-12-28 DIAGNOSIS — N42.83 CYST OF PROSTATE: Primary | ICD-10-CM

## 2023-12-28 LAB
BILIRUB SERPL-MCNC: NEGATIVE MG/DL
BLOOD URINE, POC: NORMAL
COLOR, POC UA: YELLOW
GLUCOSE UR QL STRIP: NEGATIVE
KETONES UR QL STRIP: NEGATIVE
LEUKOCYTE ESTERASE URINE, POC: NEGATIVE
NITRITE, POC UA: NEGATIVE
PH, POC UA: 5.5
PROTEIN, POC: NEGATIVE
SPECIFIC GRAVITY, POC UA: 1.03
UROBILINOGEN, POC UA: 0.2

## 2023-12-28 PROCEDURE — 99214 OFFICE O/P EST MOD 30 MIN: CPT | Mod: PBBFAC | Performed by: UROLOGY

## 2023-12-28 PROCEDURE — 81001 URINALYSIS AUTO W/SCOPE: CPT | Mod: PBBFAC | Performed by: UROLOGY

## 2023-12-28 PROCEDURE — 99213 OFFICE O/P EST LOW 20 MIN: CPT | Mod: S$PBB,,, | Performed by: UROLOGY

## 2023-12-28 PROCEDURE — 3078F DIAST BP <80 MM HG: CPT | Mod: CPTII,,, | Performed by: UROLOGY

## 2023-12-28 PROCEDURE — 3008F BODY MASS INDEX DOCD: CPT | Mod: CPTII,,, | Performed by: UROLOGY

## 2023-12-28 PROCEDURE — 3074F SYST BP LT 130 MM HG: CPT | Mod: CPTII,,, | Performed by: UROLOGY

## 2023-12-28 NOTE — PROGRESS NOTES
Pt seen by Dr. Mcmahon; Pt instructed to return to clinic in 5 months w/PSA; Discharge paperwork given w/pt verbalizing understanding

## 2024-01-02 ENCOUNTER — ANESTHESIA EVENT (OUTPATIENT)
Dept: SURGERY | Facility: HOSPITAL | Age: 49
End: 2024-01-02
Payer: MEDICAID

## 2024-01-02 NOTE — ANESTHESIA PREPROCEDURE EVALUATION
01/02/2024  Kobe Sidhu is a 48 y.o., male. For Left inguinial hernia repair    Active Ambulatory Problems     Diagnosis Date Noted    Rotator cuff injury, right, sequela 12/29/2022    Mixed hyperlipidemia 01/24/2023    Prediabetes 01/24/2023    Syphilis 01/24/2023    Left inguinal hernia 12/19/2023     Resolved Ambulatory Problems     Diagnosis Date Noted    ANGE (acute kidney injury) 12/29/2022     No Additional Past Medical History       Past Surgical History:   Procedure Laterality Date    APPENDECTOMY  1984           No current facility-administered medications on file prior to encounter.     Current Outpatient Medications on File Prior to Encounter   Medication Sig Dispense Refill    aspirin-caffeine (DESIRAE BACK AND BODY) 500-32.5 mg Tab Take by mouth.      atorvastatin (LIPITOR) 40 MG tablet Take 1 tablet (40 mg total) by mouth every evening. For High Cholesterol 30 tablet 3    baclofen (LIORESAL) 10 MG tablet Take 1 tablet (10 mg total) by mouth 3 (three) times daily as needed (body aches). 30 tablet 0    diclofenac sodium (VOLTAREN) 1 % Gel Apply 2 g topically 2 (two) times daily as needed. For lower extremity joints, apply 4 g on joint up to 4x a day.  For upper extremity joints, apply 2 g on joint up to 4x a day.  Do not use this at the same time as taking an oral NSAID such as Diclofenac, Ibuprofen, Mobic, Advil, Aleve, Naproxen etc.  However you can alternate this topical NSAID with oral NSAID.   You can take Tylenol while using this medication. (Patient not taking: Reported on 11/9/2023) 200 g 0           Pre-op Assessment          Review of Systems         Anesthesia Plan  Type of Anesthesia, risks & benefits discussed:    Anesthesia Type: Gen ETT  Intra-op Monitoring Plan: Standard ASA Monitors  Post Op Pain Control Plan: IV/PO Opioids PRN  Induction:  IV  Airway Plan: Direct  Informed  Consent: Informed consent signed with the Patient representative and all parties understand the risks and agree with anesthesia plan.  All questions answered. Patient consented to blood products? No  ASA Score: 2  Day of Surgery Review of History & Physical: H&P Update referred to the surgeon/provider.    Ready For Surgery From Anesthesia Perspective.     .

## 2024-01-03 ENCOUNTER — ANESTHESIA (OUTPATIENT)
Dept: SURGERY | Facility: HOSPITAL | Age: 49
End: 2024-01-03
Payer: MEDICAID

## 2024-01-03 ENCOUNTER — HOSPITAL ENCOUNTER (OUTPATIENT)
Facility: HOSPITAL | Age: 49
Discharge: HOME | End: 2024-01-03
Attending: SURGERY | Admitting: SURGERY
Payer: MEDICAID

## 2024-01-03 DIAGNOSIS — K40.90 LEFT INGUINAL HERNIA: ICD-10-CM

## 2024-01-03 PROCEDURE — 36000707: Performed by: SURGERY

## 2024-01-03 PROCEDURE — 63600175 PHARM REV CODE 636 W HCPCS: Performed by: NURSE ANESTHETIST, CERTIFIED REGISTERED

## 2024-01-03 PROCEDURE — 63600175 PHARM REV CODE 636 W HCPCS: Performed by: STUDENT IN AN ORGANIZED HEALTH CARE EDUCATION/TRAINING PROGRAM

## 2024-01-03 PROCEDURE — 37000009 HC ANESTHESIA EA ADD 15 MINS: Performed by: SURGERY

## 2024-01-03 PROCEDURE — 25000003 PHARM REV CODE 250: Performed by: NURSE ANESTHETIST, CERTIFIED REGISTERED

## 2024-01-03 PROCEDURE — D9220A PRA ANESTHESIA: Mod: ANES,,, | Performed by: SPECIALIST

## 2024-01-03 PROCEDURE — C1781 MESH (IMPLANTABLE): HCPCS | Performed by: SURGERY

## 2024-01-03 PROCEDURE — 71000033 HC RECOVERY, INTIAL HOUR: Performed by: SURGERY

## 2024-01-03 PROCEDURE — D9220A PRA ANESTHESIA: Mod: CRNA,,, | Performed by: NURSE ANESTHETIST, CERTIFIED REGISTERED

## 2024-01-03 PROCEDURE — 71000015 HC POSTOP RECOV 1ST HR: Performed by: SURGERY

## 2024-01-03 PROCEDURE — 63600175 PHARM REV CODE 636 W HCPCS: Mod: JZ,JG | Performed by: SURGERY

## 2024-01-03 PROCEDURE — 25000003 PHARM REV CODE 250: Performed by: SPECIALIST

## 2024-01-03 PROCEDURE — 88302 TISSUE EXAM BY PATHOLOGIST: CPT | Mod: TC | Performed by: SURGERY

## 2024-01-03 PROCEDURE — 37000008 HC ANESTHESIA 1ST 15 MINUTES: Performed by: SURGERY

## 2024-01-03 PROCEDURE — 63600175 PHARM REV CODE 636 W HCPCS: Performed by: SPECIALIST

## 2024-01-03 PROCEDURE — 36000706: Performed by: SURGERY

## 2024-01-03 PROCEDURE — C1729 CATH, DRAINAGE: HCPCS | Performed by: SURGERY

## 2024-01-03 PROCEDURE — 71000016 HC POSTOP RECOV ADDL HR: Performed by: SURGERY

## 2024-01-03 DEVICE — MESH KNITTED ST 3 X 6: Type: IMPLANTABLE DEVICE | Site: INGUINAL | Status: FUNCTIONAL

## 2024-01-03 RX ORDER — HEPARIN SODIUM 5000 [USP'U]/ML
5000 INJECTION, SOLUTION INTRAVENOUS; SUBCUTANEOUS EVERY 8 HOURS
Status: DISCONTINUED | OUTPATIENT
Start: 2024-01-03 | End: 2024-01-03 | Stop reason: HOSPADM

## 2024-01-03 RX ORDER — MEPERIDINE HYDROCHLORIDE 25 MG/ML
12.5 INJECTION INTRAMUSCULAR; INTRAVENOUS; SUBCUTANEOUS ONCE
Status: ACTIVE | OUTPATIENT
Start: 2024-01-03 | End: 2024-01-04

## 2024-01-03 RX ORDER — ONDANSETRON 2 MG/ML
4 INJECTION INTRAMUSCULAR; INTRAVENOUS ONCE
Status: ACTIVE | OUTPATIENT
Start: 2024-01-03

## 2024-01-03 RX ORDER — MIDAZOLAM HYDROCHLORIDE 1 MG/ML
2 INJECTION INTRAMUSCULAR; INTRAVENOUS ONCE AS NEEDED
Status: COMPLETED | OUTPATIENT
Start: 2024-01-03 | End: 2024-01-03

## 2024-01-03 RX ORDER — FENTANYL CITRATE 50 UG/ML
INJECTION, SOLUTION INTRAMUSCULAR; INTRAVENOUS
Status: DISCONTINUED | OUTPATIENT
Start: 2024-01-03 | End: 2024-01-03

## 2024-01-03 RX ORDER — PROCHLORPERAZINE EDISYLATE 5 MG/ML
5 INJECTION INTRAMUSCULAR; INTRAVENOUS ONCE AS NEEDED
Status: ACTIVE | OUTPATIENT
Start: 2024-01-03 | End: 2035-06-01

## 2024-01-03 RX ORDER — DEXAMETHASONE SODIUM PHOSPHATE 4 MG/ML
INJECTION, SOLUTION INTRA-ARTICULAR; INTRALESIONAL; INTRAMUSCULAR; INTRAVENOUS; SOFT TISSUE
Status: DISCONTINUED | OUTPATIENT
Start: 2024-01-03 | End: 2024-01-03

## 2024-01-03 RX ORDER — KETOROLAC TROMETHAMINE 30 MG/ML
INJECTION, SOLUTION INTRAMUSCULAR; INTRAVENOUS
Status: DISCONTINUED | OUTPATIENT
Start: 2024-01-03 | End: 2024-01-03

## 2024-01-03 RX ORDER — DIPHENHYDRAMINE HYDROCHLORIDE 50 MG/ML
25 INJECTION, SOLUTION INTRAMUSCULAR; INTRAVENOUS ONCE AS NEEDED
Status: ACTIVE | OUTPATIENT
Start: 2024-01-03 | End: 2035-06-01

## 2024-01-03 RX ORDER — LIDOCAINE HYDROCHLORIDE 20 MG/ML
INJECTION INTRAVENOUS
Status: DISCONTINUED | OUTPATIENT
Start: 2024-01-03 | End: 2024-01-03

## 2024-01-03 RX ORDER — CEFAZOLIN SODIUM 1 G/3ML
2 INJECTION, POWDER, FOR SOLUTION INTRAMUSCULAR; INTRAVENOUS
Status: COMPLETED | OUTPATIENT
Start: 2024-01-03 | End: 2024-01-03

## 2024-01-03 RX ORDER — HYDROCODONE BITARTRATE AND ACETAMINOPHEN 5; 325 MG/1; MG/1
1 TABLET ORAL EVERY 6 HOURS PRN
Qty: 20 TABLET | Refills: 0 | Status: SHIPPED | OUTPATIENT
Start: 2024-01-03 | End: 2024-01-18 | Stop reason: ALTCHOICE

## 2024-01-03 RX ORDER — SODIUM CHLORIDE, SODIUM LACTATE, POTASSIUM CHLORIDE, CALCIUM CHLORIDE 600; 310; 30; 20 MG/100ML; MG/100ML; MG/100ML; MG/100ML
INJECTION, SOLUTION INTRAVENOUS CONTINUOUS
Status: ACTIVE | OUTPATIENT
Start: 2024-01-03

## 2024-01-03 RX ORDER — HYDROMORPHONE HYDROCHLORIDE 1 MG/ML
0.5 INJECTION, SOLUTION INTRAMUSCULAR; INTRAVENOUS; SUBCUTANEOUS EVERY 5 MIN PRN
Status: DISPENSED | OUTPATIENT
Start: 2024-01-03

## 2024-01-03 RX ORDER — IPRATROPIUM BROMIDE AND ALBUTEROL SULFATE 2.5; .5 MG/3ML; MG/3ML
3 SOLUTION RESPIRATORY (INHALATION) ONCE AS NEEDED
Status: ACTIVE | OUTPATIENT
Start: 2024-01-03 | End: 2035-06-01

## 2024-01-03 RX ORDER — ROCURONIUM BROMIDE 10 MG/ML
INJECTION, SOLUTION INTRAVENOUS
Status: DISCONTINUED | OUTPATIENT
Start: 2024-01-03 | End: 2024-01-03

## 2024-01-03 RX ORDER — PROPOFOL 10 MG/ML
VIAL (ML) INTRAVENOUS
Status: DISCONTINUED | OUTPATIENT
Start: 2024-01-03 | End: 2024-01-03

## 2024-01-03 RX ORDER — DEXMEDETOMIDINE HYDROCHLORIDE 100 UG/ML
INJECTION, SOLUTION INTRAVENOUS
Status: DISCONTINUED | OUTPATIENT
Start: 2024-01-03 | End: 2024-01-03

## 2024-01-03 RX ORDER — BUPIVACAINE HYDROCHLORIDE 2.5 MG/ML
INJECTION, SOLUTION EPIDURAL; INFILTRATION; INTRACAUDAL
Status: DISCONTINUED | OUTPATIENT
Start: 2024-01-03 | End: 2024-01-03 | Stop reason: HOSPADM

## 2024-01-03 RX ORDER — OXYCODONE AND ACETAMINOPHEN 5; 325 MG/1; MG/1
2 TABLET ORAL ONCE
Status: COMPLETED | OUTPATIENT
Start: 2024-01-03 | End: 2024-01-03

## 2024-01-03 RX ORDER — SODIUM CHLORIDE 9 MG/ML
INJECTION, SOLUTION INTRAVENOUS CONTINUOUS
Status: DISCONTINUED | OUTPATIENT
Start: 2024-01-03 | End: 2024-01-03 | Stop reason: HOSPADM

## 2024-01-03 RX ORDER — ONDANSETRON 2 MG/ML
INJECTION INTRAMUSCULAR; INTRAVENOUS
Status: DISCONTINUED | OUTPATIENT
Start: 2024-01-03 | End: 2024-01-03

## 2024-01-03 RX ORDER — HYDROMORPHONE HYDROCHLORIDE 1 MG/ML
0.2 INJECTION, SOLUTION INTRAMUSCULAR; INTRAVENOUS; SUBCUTANEOUS EVERY 5 MIN PRN
Status: ACTIVE | OUTPATIENT
Start: 2024-01-03

## 2024-01-03 RX ADMIN — DEXAMETHASONE SODIUM PHOSPHATE 8 MG: 4 INJECTION, SOLUTION INTRA-ARTICULAR; INTRALESIONAL; INTRAMUSCULAR; INTRAVENOUS; SOFT TISSUE at 10:01

## 2024-01-03 RX ADMIN — PROPOFOL 200 MG: 10 INJECTION, EMULSION INTRAVENOUS at 10:01

## 2024-01-03 RX ADMIN — FENTANYL CITRATE 50 MCG: 50 INJECTION INTRAMUSCULAR; INTRAVENOUS at 10:01

## 2024-01-03 RX ADMIN — LIDOCAINE HYDROCHLORIDE 50 MG: 20 INJECTION INTRAVENOUS at 10:01

## 2024-01-03 RX ADMIN — KETOROLAC TROMETHAMINE 30 MG: 30 INJECTION, SOLUTION INTRAMUSCULAR at 11:01

## 2024-01-03 RX ADMIN — LIDOCAINE HYDROCHLORIDE 50 MG: 20 INJECTION INTRAVENOUS at 11:01

## 2024-01-03 RX ADMIN — HYDROMORPHONE HYDROCHLORIDE 0.5 MG: 1 INJECTION, SOLUTION INTRAMUSCULAR; INTRAVENOUS; SUBCUTANEOUS at 12:01

## 2024-01-03 RX ADMIN — ONDANSETRON 4 MG: 2 INJECTION INTRAMUSCULAR; INTRAVENOUS at 11:01

## 2024-01-03 RX ADMIN — HEPARIN SODIUM 5000 UNITS: 5000 INJECTION, SOLUTION INTRAVENOUS; SUBCUTANEOUS at 06:01

## 2024-01-03 RX ADMIN — MIDAZOLAM HYDROCHLORIDE 2 MG: 1 INJECTION, SOLUTION INTRAMUSCULAR; INTRAVENOUS at 09:01

## 2024-01-03 RX ADMIN — OXYCODONE HYDROCHLORIDE AND ACETAMINOPHEN 2 TABLET: 5; 325 TABLET ORAL at 01:01

## 2024-01-03 RX ADMIN — SUGAMMADEX 200 MG: 100 INJECTION, SOLUTION INTRAVENOUS at 11:01

## 2024-01-03 RX ADMIN — DEXMEDETOMIDINE 10 MCG: 200 INJECTION, SOLUTION INTRAVENOUS at 10:01

## 2024-01-03 RX ADMIN — ROCURONIUM BROMIDE 40 MG: 10 INJECTION INTRAVENOUS at 10:01

## 2024-01-03 RX ADMIN — SODIUM CHLORIDE, POTASSIUM CHLORIDE, SODIUM LACTATE AND CALCIUM CHLORIDE: 600; 310; 30; 20 INJECTION, SOLUTION INTRAVENOUS at 09:01

## 2024-01-03 RX ADMIN — CEFAZOLIN 2 G: 330 INJECTION, POWDER, FOR SOLUTION INTRAMUSCULAR; INTRAVENOUS at 10:01

## 2024-01-03 NOTE — OP NOTE
OPERATIVE NOTE    Procedure(s):  REPAIR, HERNIA, INGUINAL Operative Note  Kobe Sidhu  MRN: [id number]  : 1975    [facility] OR  Surgeon(s):  Fredi Degroot Jr., MD  General    Pre-Op Diagnosis Codes:     * Left inguinal hernia [K40.90]    Post-Op Diagnosis Codes:     * Left inguinal hernia [K40.90]    Surgeon(s) and Role:     * Fredi Degroot Jr., MD - Primary     * Ivana Ruiz MD - Resident, Assisting    Anesthesia: General    Indication for Procedure:    This is a 48 y.o. male presenting with reducible L inguinal hernia. After the risks, benefits, and alternatives of the procedure were explained to the patient they agree to proceed with surgery. Appropriate consent was obtained.    Findings:    Procedure Details:   The patient was taken to the operating room where they were placed supine on the operating table. General endotracheal anesthesia was induced without difficulty. Bilateral lower extremity compression devices were placed and appropriate perioperative antibiotics were administered. The patient's abdomen was prepped and draped in the usual sterile fashion. A timeout was performed.      A #15 blade scalpel was used to make an oblique skin incision, parallel and superior to the inguinal ligament. This was deepened through Agustíns and Campers fascia using electrocautery to the level of the external oblique aponeurosis. The external oblique aponeurosis was opened in the direction of its fibers through the external ring using a scalpel and metzenbaum scissors taking care not to injure the ilioinguinal nerve. The inguinal floor was exposed by creating superior and inferior flaps of the external oblique. The spermatic cord was identified, mobilized at the pubic tubercle, and isolated using a Penrose drain. Cremasteric fibers were dissected from the cord. The anteromedial aspect of the cord was examined and an indirect hernia sac was identified. The sac was carefully dissected free of the  cord down to the level of the internal ring. The vas deferens and testicular vessels were identified and protected during the remainder of the operation. The sac was opened and the contents were reduced into the peritoneal cavity. Redundant sac was excised using electrocautery. The stump of the sac was closed in a pursestring manner with 2-0 vicryl suture, checked for hemostasis, and allowed to retract into the abdomen. The floor of the inguinal canal was assessed digitally and found to be intact. To repair the floor of the canal, a polypropylene mesh was cut to size in an oval with a longitudinal lateral opening. Starting at the pubic tubercle, the mesh was secured flat with running 0-Prolene sutures to the shelving edge of the inguinal ligament inferiorly, the conjoint tendon superiorly. The Penrose drain was removed and the cord itself was returned to its anatomic location above the mesh. Hemostasis was achieved using electrocautery. The external oblique aponeurosis was re- approximated using a continuous 3-0 vicryl suture. Agustíns fascia was closed with running 3-0 vicryl and the skin was closed using a 4-0 subcuticular continuous monofilament suture. The operative field was cleaned and dried. Dermabond was applied. The testes were gently pulled down into its anatomic position in the scrotum.    The patient tolerated the procedure well and was extubated and taken to recovery in stable condition. All instrument counts were reported as correct at the end of the case x2. Dr. Degroot was scrubbed for the critical portions of the case.    Estimated Blood Loss:  Minimal     Drains: none           Specimens:   Specimens (From admission, onward)       Start     Ordered    01/03/24 1111  Specimen to Pathology  RELEASE UPON ORDERING        References:    Click here for ordering Quick Tip   Question:  Release to patient  Answer:  Immediate    01/03/24 1111                       Implants:   Implant Name Type Inv. Item  Serial No.  Lot No. LRB No. Used Action   MESH KNITTED ST 3 X 6 - [id number]  MESH KNITTED ST 3 X 6  [employer] [place] [id number] 1 Implanted        Complications:  None           Disposition: PACU - hemodynamically stable. Discharge home today.           Condition: stable     Ivana Ruiz MD  [facility] HO-III

## 2024-01-03 NOTE — TRANSFER OF CARE
Anesthesia Transfer of Care Note    Patient: Kobe Sidhu    Procedure(s) Performed: Procedure(s) (LRB):  REPAIR, HERNIA, INGUINAL (Left)    Patient location: PACU    Anesthesia Type: general    Transport from OR: Transported from OR on room air with adequate spontaneous ventilation    Post pain: adequate analgesia    Post assessment: no apparent anesthetic complications    Post vital signs: stable    Level of consciousness: sedated    Nausea/Vomiting: no nausea/vomiting    Complications: none    Transfer of care protocol was followed      Last vitals:

## 2024-01-03 NOTE — ANESTHESIA PROCEDURE NOTES
Intubation    Date/Time: 1/3/2024 10:03 AM    Performed by: Krissy Frias CRNA  Authorized by: Kiara Jansen MD    Intubation:     Induction:  Intravenous    Intubated:  Postinduction    Mask Ventilation:  Easy with oral airway    Attempts:  1    Attempted By:  CRNA    Method of Intubation:  Direct    Blade:  Man 2    Laryngeal View Grade: Grade I - full view of cords      Difficult Airway Encountered?: No      Complications:  None    Airway Device:  Oral endotracheal tube    Airway Device Size:  7.5    Style/Cuff Inflation:  Cuffed (inflated to minimal occlusive pressure)    Inflation Amount (mL):  5    Tube secured:  22    Secured at:  The lips    Placement Verified By:  Capnometry    Complicating Factors:  None    Findings Post-Intubation:  BS equal bilateral and atraumatic/condition of teeth unchanged

## 2024-01-03 NOTE — DISCHARGE INSTRUCTIONS
FOLLOW UP: Appointment in [facility] __Jan 18th @ 11:00 AM___.  · PAIN: Apply ice pack to groin as needed for pain. Alternate Tylenol and Ibuprofen (regular over the counter- follow instructions on the bottle). Take Your prescription pain medication AS PRESCRIBED ONLY for severe pain unrelieved by Tylenol (acetaminophen) or Ibuprofen.  · EXAMPLE: Take Tylenol. Three hours later take Ibuprofen. Three hours after that take Tylenol again, and repeat until no longer needed. If Pain is still bad once you start Tylenol and Ibuprofen, add pain medication. Dont drive or drink alcohol with pain medication. Dont take pain medication more often than it is prescribed to be taken.  INFECTION: Call your doctor at [phone] or report to the emergency room:  - if redness around your incision begins to spread, or you have swelling.  - If your incision has opened up  - If you have green, yellow, or cottage cheese-like drainage coming from your incision  - If you begin to run fever over 100.4 F  - if you are feeling weaker  - INCISION (WOUND) CARE: Leave adhesive glue on your skin until the glue peels away on its own. You may take a shower tomorrow. Wash gently over incision site - do not scrub or peel skin glue. Do not soak your wounds in water for 2 weeks. Do not take baths, swim, or use a hot tub until your doctor says it is okay.  · NAUSEA: You can eat and drink whatever you like as tolerated. You may experience post anesthesia nausea. Apply cold cloths to your face and neck and call your doctor for a prescription for nausea medication. If you have any uncontrolled vomiting that is not resolving within a few hours, report to your emergency room. Resume all medications tomorrow.  · ACTIVITY: Do not lift anything that is heavier than 10 lbs. (4.5 kg) for 1 week. Return to work when you feel well enough: your pain is controlled without the narcotic pain medication and you feel strong enough. Do not drink alcohol or drive  today, or as long as you are on pain medication.  · Notify MD of any moderate to severe pain unrelieved by pain medicine or for any signs of infection including fever above 100.4, excessive redness or swelling, yellow/green foul- smelling drainage, nausea or vomiting. Clinics number is [phone]. If it is after business hours or emergency call [phone] and state Im having post op complications and need to speak to the surgeon on call.  · Thanks for choosing [facility]! Have a smooth recovery!

## 2024-01-03 NOTE — ANESTHESIA POSTPROCEDURE EVALUATION
Anesthesia Post Evaluation    Patient: Kobe Sidhu    Procedure(s) Performed: Procedure(s) (LRB):  REPAIR, HERNIA, INGUINAL (Left)    Final Anesthesia Type: general      Patient location during evaluation: PACU  Patient participation: Yes- Able to Participate  Level of consciousness: awake and responds to stimulation  Post-procedure vital signs: reviewed and stable  Pain management: adequate  Airway patency: patent    PONV status at discharge: No PONV  Anesthetic complications: no      Cardiovascular status: blood pressure returned to baseline  Respiratory status: unassisted  Hydration status: euvolemic  Follow-up not needed.              Vitals Value Taken Time   /92 01/03/24 1231   Temp 35.9 °C (96.6 °F) 01/03/24 1229   Pulse 48 01/03/24 1246   Resp 14 01/03/24 1216   SpO2 99 % 01/03/24 1246   Vitals shown include unvalidated device data.      Event Time   Out of Recovery 12:23:00         Pain/Pushpa Score: Pain Rating Prior to Med Admin: 8 (1/3/2024 12:18 PM)  Pushpa Score: 10 (1/3/2024 12:20 PM)

## 2024-01-04 VITALS
RESPIRATION RATE: 20 BRPM | BODY MASS INDEX: 26.39 KG/M2 | HEART RATE: 52 BPM | WEIGHT: 158.63 LBS | TEMPERATURE: 98 F | SYSTOLIC BLOOD PRESSURE: 112 MMHG | DIASTOLIC BLOOD PRESSURE: 79 MMHG | OXYGEN SATURATION: 100 %

## 2024-01-04 LAB
ESTROGEN SERPL-MCNC: NORMAL PG/ML
INSULIN SERPL-ACNC: NORMAL U[IU]/ML
LAB AP CLINICAL INFORMATION: NORMAL
LAB AP GROSS DESCRIPTION: NORMAL
LAB AP REPORT FOOTNOTES: NORMAL
T3RU NFR SERPL: NORMAL %

## 2024-01-04 NOTE — DISCHARGE SUMMARY
[facility]  Discharge Note  Short Stay    Procedure(s) (LRB):  REPAIR, HERNIA, INGUINAL (Left)      OUTCOME: Patient tolerated treatment/procedure well without complication and is now ready for discharge.    DISPOSITION: Home or Self Care    FINAL DIAGNOSIS:  reducible L inguinal hernia    FOLLOWUP: In clinic in 2 weeks    DISCHARGE INSTRUCTIONS:    Discharge Procedure Orders   Diet Adult Regular     Lifting restrictions   Order Comments: Do not lift > 15 lbs for 4 weeks     Notify your health care provider if you experience any of the following:  temperature >100.4     Notify your health care provider if you experience any of the following:  persistent nausea and vomiting or diarrhea     Notify your health care provider if you experience any of the following:  severe uncontrolled pain     Notify your health care provider if you experience any of the following:  redness, tenderness, or signs of infection (pain, swelling, redness, odor or green/yellow discharge around incision site)     No dressing needed   Order Comments: Okay to shower 24 hour after surgery. Let warm soapy water run over incision do not scrub or submerge.         Clinical Reference Documents Added to Patient Instructions         Document    GROIN HERNIA REPAIR DISCHARGE INSTRUCTIONS (ENGLISH)            TIME SPENT ON DISCHARGE: 30 minutes    Ivana Ruiz MD  [facility] General Surgery [facility]

## 2024-01-16 NOTE — PROGRESS NOTES
CC:  Imaging follow-up    HPI:  Kobe Sidhu is a 48 y.o. male seen for follow-up of MRI.  He was having abdominal pain and went to the emergency room yesterday.  He was diagnosed with a left hernia and after reducing the hernia was sent out and a referral was generated to General surgery.  As he was leaving he began having severe abdominal pain and returned to the emergency room.  They then got a CT scan which showed a prostatic cyst.  He has no urinary complaints.  PSA was normal in April of 2023.  I ordered an MRI at the last visit.  I sent a referral to General surgery for the hernia and he is scheduled for repair of that next week.    Urinalysis:  Results for orders placed or performed in visit on 12/28/23   POCT URINE DIPSTICK WITH MICROSCOPE, AUTOMATED   Result Value Ref Range    Color, UA Yellow     Spec Grav UA 1.030     pH, UA 5.5     WBC, UA Negative     Nitrite, UA Negative     Protein, POC Negative     Glucose, UA Negative     Ketones, UA Negative     Urobilinogen, UA 0.2     Bilirubin, POC Negative     Blood, UA Trace-intact      Microscopic:0-1 RBC per HPF      Lab Results:  PSA History:     Latest Reference Range & Units 04/27/23 08:17   Prostate Specific Antigen <=4.00 ng/mL 0.78       Imaging:  MRI - 5 December 2023:  Prostate volume is 28 cc    A 32 mm x 30 mm x 27 mm midline cystic mass extends from the posterior prostatic base between the seminal vesicles and is most consistent with a mullerian duct cyst or large prostatic utricle.  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     ROS:  All systems reviewed and are negative except as documented in HPI and/or Assessment and Plan.     Patient Active Problem List:     Patient Active Problem List   Diagnosis    Rotator cuff injury, right, sequela    Mixed hyperlipidemia    Prediabetes    Syphilis    Left inguinal hernia        Past Medical History:  Past Medical History:   Diagnosis Date    Mixed hyperlipidemia         Past Surgical History:  Past Surgical  History:   Procedure Laterality Date    APPENDECTOMY  1984    REPAIR, HERNIA, INGUINAL Left 1/3/2024    Procedure: REPAIR, HERNIA, INGUINAL;  Surgeon: Fredi Degroot Jr., MD;  Location: [facility];  Service: General;  Laterality: Left;        Family History:  Family History   Problem Relation Age of Onset    Cancer Mother     Cancer Maternal Grandmother         Social History:  Social History     Socioeconomic History    Marital status: Single   Occupational History    Occupation: cuts grass   Tobacco Use    Smoking status: Former     Current packs/day: 0.00     Average packs/day: 0.3 packs/day for 1.5 years (0.4 ttl pk-yrs)     Types: Cigarettes     Start date: 07/2019     Quit date: 2021     Years since quitting: 3.0    Smokeless tobacco: Never   Substance and Sexual Activity    Alcohol use: Yes     Alcohol/week: 2.0 standard drinks of alcohol     Types: 2 Cans of beer per week     Comment: only for football    Drug use: Never    Sexual activity: Yes     Partners: Female     Social Determinants of Health     Financial Resource Strain: Low Risk  (1/24/2023)    Overall Financial Resource Strain (CARDIA)     Difficulty of Paying Living Expenses: Not hard at all   Food Insecurity: No Food Insecurity (1/24/2023)    Hunger Vital Sign     Worried About Running Out of Food in the Last Year: Never true     Ran Out of Food in the Last Year: Never true   Transportation Needs: No Transportation Needs (1/24/2023)    PRAPARE - Transportation     Lack of Transportation (Medical): No     Lack of Transportation (Non-Medical): No   Physical Activity: Insufficiently Active (12/29/2022)    Exercise Vital Sign     Days of Exercise per Week: 3 days     Minutes of Exercise per Session: 30 min   Stress: No Stress Concern Present (1/24/2023)    [employer] - Occupational Stress Questionnaire     Feeling of Stress : Not at all   Social Connections: Moderately Integrated (1/24/2023)    Social Connection and  Isolation Panel [NHANES]     Frequency of Communication with Friends and Family: More than three times a week     Frequency of Social Gatherings with Friends and Family: More than three times a week     Attends Congregational Services: More than 4 times per year     Active Member of Clubs or Organizations: Yes     Attends Club or Organization Meetings: More than 4 times per year     Marital Status:    Housing Stability: Unknown (1/24/2023)    Housing Stability Vital Sign     Unable to Pay for Housing in the Last Year: No     Unstable Housing in the Last Year: No        Allergies:  Review of patient's allergies indicates:  No Known Allergies     Objective:  Vitals:    12/28/23 1406   BP: 107/73   Pulse: 63   Resp: 20   Temp: 98 °F (36.7 °C)     General:  Well developed, well nourished adult male in no acute distress  Abdomen: Soft, nontender, no masses  Extremities:  No clubbing, cyanosis, or edema  Neurologic:  Grossly intact  Musculoskeletal:  Normal tone    Last FLORINA:  November 2023    Assessment:  1. Cyst of prostate  - POCT URINE DIPSTICK WITH MICROSCOPE, AUTOMATED  - PSA, Total (Diagnostic); Future     Plan:  I explained the benign nature of this.  We will continue to watch this and follow his PSAs.    Follow-up:  May 2024 after a PSA prior to the visit.

## 2024-01-18 ENCOUNTER — OFFICE VISIT (OUTPATIENT)
Dept: SURGERY | Facility: CLINIC | Age: 49
End: 2024-01-18
Payer: MEDICAID

## 2024-01-18 VITALS
SYSTOLIC BLOOD PRESSURE: 115 MMHG | WEIGHT: 174 LBS | RESPIRATION RATE: 18 BRPM | OXYGEN SATURATION: 98 % | DIASTOLIC BLOOD PRESSURE: 83 MMHG | HEIGHT: 65 IN | HEART RATE: 86 BPM | BODY MASS INDEX: 28.99 KG/M2

## 2024-01-18 DIAGNOSIS — Z98.890 S/P INGUINAL HERNIA REPAIR: Primary | ICD-10-CM

## 2024-01-18 DIAGNOSIS — Z87.19 S/P INGUINAL HERNIA REPAIR: Primary | ICD-10-CM

## 2024-01-18 PROCEDURE — 99213 OFFICE O/P EST LOW 20 MIN: CPT | Mod: PBBFAC

## 2024-01-18 RX ORDER — IBUPROFEN 800 MG/1
TABLET ORAL
COMMUNITY
Start: 2023-11-28 | End: 2024-04-26

## 2024-01-18 NOTE — PROGRESS NOTES
\[facility]\"" General Surgery Clinic Follow-Up Note    Subjective  48-year-old male presents for postoperative visit status post open left inguinal hernia repair.  Says that he has been doing well since surgery.  No pain, but does have some numbness on his upper inner thigh.  Denies any fevers or chills.  No pain or difficulty urinating.    Objective  Gen: NAD, AAOx3  CV: extremities wwp, regular rate, palpable radials  Pulm: nonlabored, no increased wob, equal chest rise b/l   Abd: s/nt/nd   Wounds:  Well-healed left inguinal incision, no erythema or drainage    Path:   Soft tissue, left inguinal region, excision:      - Hernia sac.    A/P:    48 y.o. male status post left inguinal hernia repair  -discussed continued lifting restrictions of less than 20 lb until 2/1, then no further restrictions.  Patient verbalizes understanding.    -no other restrictions   -return to clinic p.r.nRissa Bender MD  PGY-6, \[facility]\"" General Surgery  01/18/2024

## 2024-02-19 DIAGNOSIS — Z12.11 COLON CANCER SCREENING: Primary | ICD-10-CM

## 2024-02-19 RX ORDER — POLYETHYLENE GLYCOL 3350, SODIUM SULFATE, SODIUM CHLORIDE, POTASSIUM CHLORIDE, SODIUM ASCORBATE, AND ASCORBIC ACID 7.5-2.691G
KIT ORAL
Qty: 1 KIT | Refills: 0 | Status: SHIPPED | OUTPATIENT
Start: 2024-02-19

## 2024-03-08 ENCOUNTER — TELEPHONE (OUTPATIENT)
Dept: ENDOSCOPY | Facility: HOSPITAL | Age: 49
End: 2024-03-08
Payer: MEDICAID

## 2024-03-08 NOTE — TELEPHONE ENCOUNTER
Pt returned call. 2wk pre-call for 03/22/2024 colonoscopy procedure complete. Pt states he can not read or write, asked pt if there is someone who could help with his prep instructions. Pt states, yes, he will get one of his friend girls to help him. Instructed patient to call with any questions or concerns. Pt verbalized understanding. MS

## 2024-03-08 NOTE — TELEPHONE ENCOUNTER
Attempt #1: 2wk pre-call for 03/22/2024 colonoscopy procedure. No voice mailbox set not able to leave message.MS

## 2024-03-20 ENCOUNTER — ANESTHESIA EVENT (OUTPATIENT)
Dept: ENDOSCOPY | Facility: HOSPITAL | Age: 49
End: 2024-03-20
Payer: MEDICAID

## 2024-03-20 NOTE — ANESTHESIA PREPROCEDURE EVALUATION
03/20/2024  Kobe Sidhu is a 48 y.o., male with PMHx of HLD, smoking presents for screening colonoscopy.    NO BETA BLOCKER USE    Active Ambulatory Problems     Diagnosis Date Noted    Rotator cuff injury, right, sequela 12/29/2022    Mixed hyperlipidemia 01/24/2023    Prediabetes 01/24/2023    Syphilis 01/24/2023    Left inguinal hernia 12/19/2023     Resolved Ambulatory Problems     Diagnosis Date Noted    ANGE (acute kidney injury) 12/29/2022     Past Medical History:   Diagnosis Date    S/P left inguinal hernia repair 01/03/2024       Pre-op Assessment    I have reviewed the NPO Status.      Review of Systems  Anesthesia Hx:  No problems with previous Anesthesia                Social:  Smoker       Cardiovascular:                hyperlipidemia                             Pulmonary:  Pulmonary Normal                       Renal/:  Renal/ Normal                 Hepatic/GI:  Hepatic/GI Normal                 Neurological:  Neurology Normal                                      Endocrine:  Endocrine Normal                Physical Exam  General: Alert, Cooperative and Well nourished    Airway:  Mallampati: II   Mouth Opening: Normal  TM Distance: Normal  Tongue: Normal  Neck ROM: Normal ROM    Dental:  Intact    Chest/Lungs:  Clear to auscultation, Normal Respiratory Rate    Heart:  Rate: Normal  Rhythm: Regular Rhythm  Sounds: Normal      Lab Results   Component Value Date    WBC 7.90 11/08/2023    HGB 14.4 11/08/2023    HCT 45.8 11/08/2023    MCV 94.6 (H) 11/08/2023     11/08/2023       CMP  Sodium Level   Date Value Ref Range Status   11/08/2023 141 136 - 145 mmol/L Final     Potassium Level   Date Value Ref Range Status   11/08/2023 3.8 3.5 - 5.1 mmol/L Final     Carbon Dioxide   Date Value Ref Range Status   11/08/2023 26 22 - 29 mmol/L Final     Blood Urea Nitrogen   Date Value Ref Range  Status   11/08/2023 18.0 8.9 - 20.6 mg/dL Final     Creatinine   Date Value Ref Range Status   11/08/2023 1.05 0.73 - 1.18 mg/dL Final     Calcium Level Total   Date Value Ref Range Status   11/08/2023 9.7 8.4 - 10.2 mg/dL Final     Albumin Level   Date Value Ref Range Status   11/08/2023 3.6 3.5 - 5.0 g/dL Final     Bilirubin Total   Date Value Ref Range Status   11/08/2023 0.7 <=1.5 mg/dL Final     Alkaline Phosphatase   Date Value Ref Range Status   11/08/2023 70 40 - 150 unit/L Final     Aspartate Aminotransferase   Date Value Ref Range Status   11/08/2023 20 5 - 34 unit/L Final     Alanine Aminotransferase   Date Value Ref Range Status   11/08/2023 18 0 - 55 unit/L Final     eGFR   Date Value Ref Range Status   11/08/2023 >60 mls/min/1.73/m2 Final           Anesthesia Plan  Type of Anesthesia, risks & benefits discussed:    Anesthesia Type: Gen Natural Airway  Intra-op Monitoring Plan: Standard ASA Monitors  Post Op Pain Control Plan: IV/PO Opioids PRN  Induction:  IV  Informed Consent: Informed consent signed with the Patient and all parties understand the risks and agree with anesthesia plan.  All questions answered.   ASA Score: 1  Day of Surgery Review of History & Physical: H&P Update referred to the surgeon/provider.    Ready For Surgery From Anesthesia Perspective.     .

## 2024-03-22 ENCOUNTER — ANESTHESIA (OUTPATIENT)
Dept: ENDOSCOPY | Facility: HOSPITAL | Age: 49
End: 2024-03-22
Payer: MEDICAID

## 2024-03-22 ENCOUNTER — HOSPITAL ENCOUNTER (OUTPATIENT)
Facility: HOSPITAL | Age: 49
Discharge: HOME | End: 2024-03-22
Attending: COLON & RECTAL SURGERY | Admitting: COLON & RECTAL SURGERY
Payer: MEDICAID

## 2024-03-22 VITALS
SYSTOLIC BLOOD PRESSURE: 108 MMHG | BODY MASS INDEX: 27.63 KG/M2 | OXYGEN SATURATION: 100 % | WEIGHT: 165.81 LBS | RESPIRATION RATE: 18 BRPM | HEIGHT: 65 IN | HEART RATE: 63 BPM | TEMPERATURE: 98 F | DIASTOLIC BLOOD PRESSURE: 82 MMHG

## 2024-03-22 PROCEDURE — 63600175 PHARM REV CODE 636 W HCPCS: Performed by: ANESTHESIOLOGY

## 2024-03-22 PROCEDURE — 37000008 HC ANESTHESIA 1ST 15 MINUTES: Performed by: COLON & RECTAL SURGERY

## 2024-03-22 PROCEDURE — 37000009 HC ANESTHESIA EA ADD 15 MINS: Performed by: COLON & RECTAL SURGERY

## 2024-03-22 PROCEDURE — 45378 DIAGNOSTIC COLONOSCOPY: CPT | Performed by: COLON & RECTAL SURGERY

## 2024-03-22 PROCEDURE — D9220A PRA ANESTHESIA: Mod: ,,, | Performed by: NURSE ANESTHETIST, CERTIFIED REGISTERED

## 2024-03-22 PROCEDURE — 63600175 PHARM REV CODE 636 W HCPCS: Performed by: NURSE ANESTHETIST, CERTIFIED REGISTERED

## 2024-03-22 PROCEDURE — 25000003 PHARM REV CODE 250: Performed by: NURSE ANESTHETIST, CERTIFIED REGISTERED

## 2024-03-22 RX ORDER — LIDOCAINE HYDROCHLORIDE 20 MG/ML
INJECTION, SOLUTION EPIDURAL; INFILTRATION; INTRACAUDAL; PERINEURAL
Status: DISCONTINUED | OUTPATIENT
Start: 2024-03-22 | End: 2024-03-22

## 2024-03-22 RX ORDER — SODIUM CHLORIDE, SODIUM LACTATE, POTASSIUM CHLORIDE, CALCIUM CHLORIDE 600; 310; 30; 20 MG/100ML; MG/100ML; MG/100ML; MG/100ML
INJECTION, SOLUTION INTRAVENOUS CONTINUOUS
Status: DISCONTINUED | OUTPATIENT
Start: 2024-03-22 | End: 2024-03-22 | Stop reason: HOSPADM

## 2024-03-22 RX ORDER — PROPOFOL 10 MG/ML
VIAL (ML) INTRAVENOUS
Status: DISCONTINUED | OUTPATIENT
Start: 2024-03-22 | End: 2024-03-22

## 2024-03-22 RX ORDER — LIDOCAINE HYDROCHLORIDE 10 MG/ML
1 INJECTION, SOLUTION EPIDURAL; INFILTRATION; INTRACAUDAL; PERINEURAL ONCE
Status: DISCONTINUED | OUTPATIENT
Start: 2024-03-22 | End: 2024-03-22 | Stop reason: HOSPADM

## 2024-03-22 RX ADMIN — PROPOFOL 30 MG: 10 INJECTION, EMULSION INTRAVENOUS at 10:03

## 2024-03-22 RX ADMIN — SODIUM CHLORIDE, POTASSIUM CHLORIDE, SODIUM LACTATE AND CALCIUM CHLORIDE: 600; 310; 30; 20 INJECTION, SOLUTION INTRAVENOUS at 10:03

## 2024-03-22 RX ADMIN — PROPOFOL 120 MG: 10 INJECTION, EMULSION INTRAVENOUS at 10:03

## 2024-03-22 RX ADMIN — LIDOCAINE HYDROCHLORIDE 75 MG: 20 INJECTION, SOLUTION EPIDURAL; INFILTRATION; INTRACAUDAL; PERINEURAL at 10:03

## 2024-03-22 RX ADMIN — PROPOFOL 40 MG: 10 INJECTION, EMULSION INTRAVENOUS at 10:03

## 2024-03-22 NOTE — PROVATION PATIENT INSTRUCTIONS
Discharge Summary/Instructions after an Endoscopic Procedure  Patient Name: Kobe Sidhu  Patient MRN: [id number]  Patient YOB: 1975  Friday, March 22, 2024  Candido Zimmer MD  Dear patient,  As a result of recent federal legislation (The Federal Cures Act), you may   receive lab or pathology results from your procedure in your [facility]   account before your physician is able to contact you. Your physician or   their representative will relay the results to you with their   recommendations at their soonest availability.  Thank you,  RESTRICTIONS:  During your procedure today, you received medications for sedation.  These   medications may affect your judgment, balance and coordination.  Therefore,   for 24 hours, you have the following restrictions:   - DO NOT drive a car, operate machinery, make legal/financial decisions,   sign important papers or drink alcohol.    ACTIVITY:  Today: no heavy lifting, straining or running due to procedural   sedation/anesthesia.  The following day: return to full activity including work.  DIET:  Eat and drink normally unless instructed otherwise.     TREATMENT FOR COMMON SIDE EFFECTS:  - Mild abdominal pain, nausea, belching, bloating or excessive gas:  rest,   eat lightly and use a heating pad.  - Sore Throat: treat with throat lozenges and/or gargle with warm salt   water.  - Because air was used during the procedure, expelling large amounts of air   from your rectum or belching is normal.  - If a bowel prep was taken, you may not have a bowel movement for 1-3 days.    This is normal.  SYMPTOMS TO WATCH FOR AND REPORT TO YOUR PHYSICIAN:  1. Abdominal pain or bloating, other than gas cramps.  2. Chest pain.  3. Back pain.  4. Signs of infection such as: chills or fever occurring within 24 hours   after the procedure.  5. Rectal bleeding, which would show as bright red, maroon, or black stools.   (A tablespoon of blood from the rectum is not serious,  especially if   hemorrhoids are present.)  6. Vomiting.  7. Weakness or dizziness.  GO DIRECTLY TO THE NEAREST EMERGENCY ROOM IF YOU HAVE ANY OF THE FOLLOWING:      Difficulty breathing              Chills and/or fever over 101 F   Persistent vomiting and/or vomiting blood   Severe abdominal pain   Severe chest pain   Black, tarry stools   Bleeding- more than one tablespoon   Any other symptom or condition that you feel may need urgent attention  Your doctor recommends these additional instructions:  If any biopsies were taken, your doctors clinic will contact you in 1 to 2   weeks with any results.  - Discharge patient to home.   - Resume previous diet.   - Continue present medications.   - Repeat colonoscopy in 10 years for screening purposes.  For questions, problems or results please call your physician - Candido Zimmer MD at Work:  [phone].  [facility] , EMERGENCY ROOM PHONE NUMBER: [phone]  IF A COMPLICATION OR EMERGENCY SITUATION ARISES AND YOU ARE UNABLE TO REACH   YOUR PHYSICIAN - GO DIRECTLY TO THE EMERGENCY ROOM.  MD Candido Pérez MD  3/22/2024 10:43:18 AM  This report has been verified and signed electronically.  Dear patient,  As a result of recent federal legislation (The Federal Cures Act), you may   receive lab or pathology results from your procedure in your [facility]   account before your physician is able to contact you. Your physician or   their representative will relay the results to you with their   recommendations at their soonest availability.  Thank you,  PROVATION

## 2024-03-22 NOTE — TRANSFER OF CARE
"Anesthesia Transfer of Care Note    Patient: Kobe Sidhu    Procedure(s) Performed: Procedure(s) (LRB):  COLONOSCOPY (N/A)    Patient location: GI    Anesthesia Type: general    Post pain: adequate analgesia    Post assessment: no apparent anesthetic complications    Post vital signs: stable    Level of consciousness: sedated    Nausea/Vomiting: no nausea/vomiting    Complications: none    Transfer of care protocol was followed      Last vitals: Visit Vitals  /64 (BP Location: Left arm, Patient Position: Lying)   Pulse (!) 50   Temp 36.8 °C (98.3 °F) (Oral)   Resp 18   Ht 5' 5" (1.651 m)   Wt 75.2 kg (165 lb 12.8 oz)   SpO2 100%   BMI 27.59 kg/m²     "

## 2024-03-22 NOTE — ANESTHESIA POSTPROCEDURE EVALUATION
Anesthesia Post Evaluation    Patient: Kobe Sidhu    Procedure(s) Performed: Procedure(s) (LRB):  COLONOSCOPY (N/A)    Final Anesthesia Type: general      Patient location during evaluation: GI PACU  Patient participation: Yes- Able to Participate  Level of consciousness: awake and alert  Post-procedure vital signs: reviewed and stable  Pain management: adequate  Airway patency: patent    PONV status at discharge: No PONV  Anesthetic complications: no      Cardiovascular status: hemodynamically stable  Respiratory status: spontaneous ventilation  Hydration status: euvolemic  Follow-up not needed.              Vitals Value Taken Time   /64 03/22/24 0947   Temp 36.8 °C (98.3 °F) 03/22/24 0947   Pulse 50 03/22/24 0947   Resp 18 03/22/24 0947   SpO2 100 % 03/22/24 0947         No case tracking events are documented in the log.      Pain/Pushpa Score: No data recorded

## 2024-03-22 NOTE — H&P
Kobe Sidhu is a 48 y.o., male with PMHx of HLD, smoking presents for screening colonoscopy.     NO BETA BLOCKER USE          Active Ambulatory Problems     Diagnosis Date Noted    Rotator cuff injury, right, sequela 12/29/2022    Mixed hyperlipidemia 01/24/2023    Prediabetes 01/24/2023    Syphilis 01/24/2023    Left inguinal hernia 12/19/2023           Resolved Ambulatory Problems     Diagnosis Date Noted    ANGE (acute kidney injury) 12/29/2022           Past Medical History:   Diagnosis Date    S/P left inguinal hernia repair 01/03/2024         Pre-op Assessment    I have reviewed the NPO Status.       Review of Systems  Anesthesia Hx:  No problems with previous Anesthesia                Social:  Smoker       Cardiovascular:                hyperlipidemia                             Pulmonary:  Pulmonary Normal                       Renal/:  Renal/ Normal                 Hepatic/GI:  Hepatic/GI Normal                 Neurological:  Neurology Normal                                      Endocrine:  Endocrine Normal                  Physical Exam  General: Alert, Cooperative and Well nourished     Airway:  Mallampati: II   Mouth Opening: Normal  TM Distance: Normal  Tongue: Normal  Neck ROM: Normal ROM     Dental:  Intact     Chest/Lungs:  Clear to auscultation, Normal Respiratory Rate     Heart:  Rate: Normal  Rhythm: Regular Rhythm  Sounds: Normal              Lab Results   Component Value Date     WBC 7.90 11/08/2023     HGB 14.4 11/08/2023     HCT 45.8 11/08/2023     MCV 94.6 (H) 11/08/2023      11/08/2023         CMP        Sodium Level   Date Value Ref Range Status   11/08/2023 141 136 - 145 mmol/L Final            Potassium Level   Date Value Ref Range Status   11/08/2023 3.8 3.5 - 5.1 mmol/L Final            Carbon Dioxide   Date Value Ref Range Status   11/08/2023 26 22 - 29 mmol/L Final            Blood Urea Nitrogen   Date Value Ref Range Status   11/08/2023 18.0 8.9 - 20.6 mg/dL Final             Creatinine   Date Value Ref Range Status   11/08/2023 1.05 0.73 - 1.18 mg/dL Final            Calcium Level Total   Date Value Ref Range Status   11/08/2023 9.7 8.4 - 10.2 mg/dL Final            Albumin Level   Date Value Ref Range Status   11/08/2023 3.6 3.5 - 5.0 g/dL Final            Bilirubin Total   Date Value Ref Range Status   11/08/2023 0.7 <=1.5 mg/dL Final            Alkaline Phosphatase   Date Value Ref Range Status   11/08/2023 70 40 - 150 unit/L Final            Aspartate Aminotransferase   Date Value Ref Range Status   11/08/2023 20 5 - 34 unit/L Final            Alanine Aminotransferase   Date Value Ref Range Status   11/08/2023 18 0 - 55 unit/L Final            eGFR   Date Value Ref Range Status   11/08/2023 >60 mls/min/1.73/m2 Final           A/P:   screening colonoscopy, colonoscopy today

## 2024-04-26 ENCOUNTER — HOSPITAL ENCOUNTER (EMERGENCY)
Facility: HOSPITAL | Age: 49
Discharge: HOME | End: 2024-04-26
Attending: INTERNAL MEDICINE
Payer: MEDICAID

## 2024-04-26 VITALS
DIASTOLIC BLOOD PRESSURE: 75 MMHG | RESPIRATION RATE: 20 BRPM | SYSTOLIC BLOOD PRESSURE: 124 MMHG | OXYGEN SATURATION: 97 % | BODY MASS INDEX: 27.15 KG/M2 | HEIGHT: 65 IN | TEMPERATURE: 98 F | HEART RATE: 75 BPM | WEIGHT: 162.94 LBS

## 2024-04-26 DIAGNOSIS — M25.511 RIGHT SHOULDER PAIN: ICD-10-CM

## 2024-04-26 DIAGNOSIS — R10.32 LEFT GROIN PAIN: Primary | ICD-10-CM

## 2024-04-26 LAB
ALBUMIN SERPL-MCNC: 3.8 G/DL (ref 3.5–5)
ALBUMIN/GLOB SERPL: 0.9 RATIO (ref 1.1–2)
ALP SERPL-CCNC: 74 UNIT/L (ref 40–150)
ALT SERPL-CCNC: 25 UNIT/L (ref 0–55)
APPEARANCE UR: CLEAR
AST SERPL-CCNC: 27 UNIT/L (ref 5–34)
BACTERIA #/AREA URNS AUTO: ABNORMAL /HPF
BASOPHILS # BLD AUTO: 0.01 X10(3)/MCL
BASOPHILS NFR BLD AUTO: 0.2 %
BILIRUB SERPL-MCNC: 1.1 MG/DL
BILIRUB UR QL STRIP.AUTO: NEGATIVE
BUN SERPL-MCNC: 11.6 MG/DL (ref 8.9–20.6)
CALCIUM SERPL-MCNC: 9.4 MG/DL (ref 8.4–10.2)
CHLORIDE SERPL-SCNC: 105 MMOL/L (ref 98–107)
CO2 SERPL-SCNC: 26 MMOL/L (ref 22–29)
COLOR UR AUTO: YELLOW
CREAT SERPL-MCNC: 1.03 MG/DL (ref 0.73–1.18)
EOSINOPHIL # BLD AUTO: 0.22 X10(3)/MCL (ref 0–0.9)
EOSINOPHIL NFR BLD AUTO: 4.7 %
ERYTHROCYTE [DISTWIDTH] IN BLOOD BY AUTOMATED COUNT: 12.6 % (ref 11.5–17)
GFR SERPLBLD CREATININE-BSD FMLA CKD-EPI: >60 MLS/MIN/1.73/M2
GLOBULIN SER-MCNC: 4.2 GM/DL (ref 2.4–3.5)
GLUCOSE SERPL-MCNC: 79 MG/DL (ref 74–100)
GLUCOSE UR QL STRIP.AUTO: NORMAL
HCT VFR BLD AUTO: 49.9 % (ref 42–52)
HGB BLD-MCNC: 16.6 G/DL (ref 14–18)
HOLD SPECIMEN: NORMAL
HOLD SPECIMEN: NORMAL
HYALINE CASTS #/AREA URNS LPF: ABNORMAL /LPF
IMM GRANULOCYTES # BLD AUTO: 0.01 X10(3)/MCL (ref 0–0.04)
IMM GRANULOCYTES NFR BLD AUTO: 0.2 %
KETONES UR QL STRIP.AUTO: NEGATIVE
LEUKOCYTE ESTERASE UR QL STRIP.AUTO: NEGATIVE
LYMPHOCYTES # BLD AUTO: 1.33 X10(3)/MCL (ref 0.6–4.6)
LYMPHOCYTES NFR BLD AUTO: 28.1 %
MCH RBC QN AUTO: 31.4 PG (ref 27–31)
MCHC RBC AUTO-ENTMCNC: 33.3 G/DL (ref 33–36)
MCV RBC AUTO: 94.5 FL (ref 80–94)
MONOCYTES # BLD AUTO: 0.35 X10(3)/MCL (ref 0.1–1.3)
MONOCYTES NFR BLD AUTO: 7.4 %
MUCOUS THREADS URNS QL MICRO: ABNORMAL /LPF
NEUTROPHILS # BLD AUTO: 2.81 X10(3)/MCL (ref 2.1–9.2)
NEUTROPHILS NFR BLD AUTO: 59.4 %
NITRITE UR QL STRIP.AUTO: NEGATIVE
NRBC BLD AUTO-RTO: 0 %
PH UR STRIP.AUTO: 6 [PH]
PLATELET # BLD AUTO: 223 X10(3)/MCL (ref 130–400)
PMV BLD AUTO: 9.4 FL (ref 7.4–10.4)
POTASSIUM SERPL-SCNC: 3.7 MMOL/L (ref 3.5–5.1)
PROT SERPL-MCNC: 8 GM/DL (ref 6.4–8.3)
PROT UR QL STRIP.AUTO: ABNORMAL
RBC # BLD AUTO: 5.28 X10(6)/MCL (ref 4.7–6.1)
RBC #/AREA URNS AUTO: ABNORMAL /HPF
RBC UR QL AUTO: NEGATIVE
SODIUM SERPL-SCNC: 140 MMOL/L (ref 136–145)
SQUAMOUS #/AREA URNS LPF: ABNORMAL /HPF
UROBILINOGEN UR STRIP-ACNC: NORMAL
WBC # SPEC AUTO: 4.73 X10(3)/MCL (ref 4.5–11.5)
WBC #/AREA URNS AUTO: ABNORMAL /HPF

## 2024-04-26 PROCEDURE — 25500020 PHARM REV CODE 255: Performed by: NURSE PRACTITIONER

## 2024-04-26 PROCEDURE — 25000003 PHARM REV CODE 250: Performed by: NURSE PRACTITIONER

## 2024-04-26 PROCEDURE — 99285 EMERGENCY DEPT VISIT HI MDM: CPT | Mod: 25

## 2024-04-26 PROCEDURE — 85025 COMPLETE CBC W/AUTO DIFF WBC: CPT | Performed by: NURSE PRACTITIONER

## 2024-04-26 PROCEDURE — 80053 COMPREHEN METABOLIC PANEL: CPT | Performed by: NURSE PRACTITIONER

## 2024-04-26 PROCEDURE — 81001 URINALYSIS AUTO W/SCOPE: CPT | Performed by: NURSE PRACTITIONER

## 2024-04-26 RX ORDER — DICLOFENAC SODIUM 75 MG/1
75 TABLET, DELAYED RELEASE ORAL 2 TIMES DAILY PRN
Qty: 20 TABLET | Refills: 0 | Status: SHIPPED | OUTPATIENT
Start: 2024-04-26

## 2024-04-26 RX ORDER — HYDROCODONE BITARTRATE AND ACETAMINOPHEN 5; 325 MG/1; MG/1
1 TABLET ORAL
Status: COMPLETED | OUTPATIENT
Start: 2024-04-26 | End: 2024-04-26

## 2024-04-26 RX ADMIN — HYDROCODONE BITARTRATE AND ACETAMINOPHEN 1 TABLET: 5; 325 TABLET ORAL at 12:04

## 2024-04-26 RX ADMIN — IOHEXOL 100 ML: 350 INJECTION, SOLUTION INTRAVENOUS at 01:04

## 2024-04-26 NOTE — Clinical Note
"Kobe"Mahendra Sidhu was seen and treated in our emergency department on 4/26/2024.  He may return to work on 04/30/2024.       If you have any questions or concerns, please don't hesitate to call.      Irvin Interiano MD"

## 2024-04-26 NOTE — ED PROVIDER NOTES
Encounter Date: 4/26/2024       History     Chief Complaint   Patient presents with    Shoulder Pain    Inguinal Hernia     C/o left inguinal hernia pain.and also continued right shoulder pain     The patient presents with right shoulder pain.  The onset was chronic increased for a week.  The course/duration of symptoms is constant.  Type of injury: none known.  Location: right shoulder pain. Radiating pain: none. The character of symptoms is pain.  The degree of pain is moderate and with certain movements.  The degree of swelling is none.  The exacerbating factor is movement.  There are relieving factors including immobilization.  Risk factors consist of none.  Prior episodes: chronic.  Therapy today: none.  Associated symptoms: none.  Additional history: none. He also reports left inguinal hernia repair in January and he has been experiencing pain at surgical site for a few days especially with certain movements. He denies abdominal pain, nausea, vomiting, dysuria, fever, and chills.          Review of patient's allergies indicates:  No Known Allergies  Past Medical History:   Diagnosis Date    Mixed hyperlipidemia     S/P left inguinal hernia repair 01/03/2024     Past Surgical History:   Procedure Laterality Date    APPENDECTOMY  1984    COLONOSCOPY N/A 3/22/2024    Procedure: COLONOSCOPY;  Surgeon: Candido Zimmer MD;  Location: [facility] ENDOSCOPY;  Service: General;  Laterality: N/A;    REPAIR, HERNIA, INGUINAL Left 1/3/2024    Procedure: REPAIR, HERNIA, INGUINAL;  Surgeon: Fredi Degroot Jr., MD;  Location: [facility] OR;  Service: General;  Laterality: Left;     Family History   Problem Relation Name Age of Onset    Cancer Mother      Cancer Maternal Grandmother       Social History     Tobacco Use    Smoking status: Former     Current packs/day: 0.00     Average packs/day: 0.3 packs/day for 1.5 years (0.4 ttl pk-yrs)     Types: Cigarettes     Start date: 07/2019     Quit date: 2021     Years since  quitting: 3.3    Smokeless tobacco: Never   Substance Use Topics    Alcohol use: Yes     Alcohol/week: 2.0 standard drinks of alcohol     Types: 2 Cans of beer per week     Comment: only for football    Drug use: Never     Review of Systems   Constitutional:  Negative for fever.   HENT:  Negative for sore throat.    Respiratory:  Negative for shortness of breath.    Cardiovascular:  Negative for chest pain.   Gastrointestinal:  Negative for nausea.   Genitourinary:  Negative for dysuria.   Musculoskeletal:  Positive for arthralgias. Negative for back pain.   Skin:  Negative for rash.   Neurological:  Negative for weakness.   Hematological:  Does not bruise/bleed easily.   All other systems reviewed and are negative.      Physical Exam     Initial Vitals [04/26/24 1048]   BP Pulse Resp Temp SpO2   124/75 77 20 97.5 °F (36.4 °C) 97 %      MAP       --         Physical Exam    Nursing note and vitals reviewed.  Constitutional: He appears well-developed and well-nourished.   HENT:   Head: Normocephalic and atraumatic.   Neck: Neck supple.   Normal range of motion.  Cardiovascular:  Normal rate, regular rhythm, normal heart sounds and intact distal pulses.           Pulmonary/Chest: Effort normal and breath sounds normal. He has no decreased breath sounds.   Abdominal: Abdomen is soft and flat. Bowel sounds are normal. There is no abdominal tenderness.   Mild ttp over surgical scar left groin   Musculoskeletal:         General: Normal range of motion.      Cervical back: Normal range of motion and neck supple.      Comments: Right Shoulder: mild ttp, FROM, good distal pulses, NVI      Neurological: He is alert and oriented to person, place, and time. He has normal strength.   Skin: Skin is warm and dry.   Psychiatric: He has a normal mood and affect.         ED Course   Procedures  Labs Reviewed   COMPREHENSIVE METABOLIC PANEL - Abnormal; Notable for the following components:       Result Value    Globulin 4.2 (*)      Albumin/Globulin Ratio 0.9 (*)     All other components within normal limits   URINALYSIS, REFLEX TO URINE CULTURE - Abnormal; Notable for the following components:    Protein, UA 1+ (*)     Squamous Epithelial Cells, UA Trace (*)     Mucous, UA Trace (*)     All other components within normal limits   CBC WITH DIFFERENTIAL - Abnormal; Notable for the following components:    MCV 94.5 (*)     MCH 31.4 (*)     All other components within normal limits   CBC W/ AUTO DIFFERENTIAL    Narrative:     The following orders were created for panel order CBC auto differential.  Procedure                               Abnormality         Status                     ---------                               -----------         ------                     CBC with Differential[[id number]]       Abnormal            Final result                 Please view results for these tests on the individual orders.   EXTRA TUBES    Narrative:     The following orders were created for panel order EXTRA TUBES.  Procedure                               Abnormality         Status                     ---------                               -----------         ------                     Light Blue Top Hold[[id number]]                             Final result               Gold Top Hold[[id number]]                                   Final result                 Please view results for these tests on the individual orders.   LIGHT BLUE TOP HOLD   GOLD TOP HOLD          Imaging Results              X-Ray Shoulder Complete 2 View Right (Final result)  Result time 04/26/24 13:36:13      Final result by Albert Ann MD (04/26/24 13:36:13)                   Impression:      No acute findings.      Electronically signed by: Albert Ann  Date:    04/26/2024  Time:    13:36               Narrative:    EXAMINATION:  XR SHOULDER COMPLETE 2 OR MORE VIEWS RIGHT    CLINICAL HISTORY:  Pain in right shoulder    COMPARISON:  9 December 2022    FINDINGS:  Three views  of the right shoulder.  There is no fracture or dislocation.  Mild glenohumeral degenerative changes.                                       CT Abdomen Pelvis With IV Contrast NO Oral Contrast (Final result)  Result time 04/26/24 13:34:46      Final result by Albert Ann MD (04/26/24 13:34:46)                   Impression:      1. No acute abdominopelvic findings.  2. Changes of left inguinal hernia repair without complication evident.      Electronically signed by: Albert Ann  Date:    04/26/2024  Time:    13:34               Narrative:    EXAMINATION:  CT ABDOMEN PELVIS WITH IV CONTRAST    CLINICAL HISTORY:  left groin pain, s/p inguinal hernia repair;    TECHNIQUE:  Helical acquisition through the abdomen and pelvis with IV contrast.  Three plane reconstructions were provided for review.  mGycm. Automatic exposure control, adjustment of mA/kV or iterative reconstruction technique was used to reduce radiation.    COMPARISON:  8 November 2023    FINDINGS:  The limited imaged lung bases are clear.    No significant abnormality of the solid abdominal organs.    No bowel obstruction. No significant inflammatory changes of the bowel.  No free air.    Urinary bladder is unremarkable.  There is stable cystic lesion superior to the prostate.  There is no pelvic free fluid.  Abdominal aorta normal in caliber.    There are changes of left inguinal hernia repair without complication evident.    No acute osseous findings.                                       Medications   iohexoL (OMNIPAQUE 350) 350 mg iodine/mL injection (has no administration in time range)   HYDROcodone-acetaminophen 5-325 mg per tablet 1 tablet (1 tablet Oral Given 4/26/24 1203)   iohexoL (OMNIPAQUE 350) injection 100 mL (100 mLs Intravenous Given 4/26/24 1322)     Medical Decision Making  The patient presents with right shoulder pain.  The onset was chronic increased for a week.  The course/duration of symptoms is constant.  Type of injury:  none known.  Location: right shoulder pain. Radiating pain: none. The character of symptoms is pain.  The degree of pain is moderate and with certain movements.  The degree of swelling is none.  The exacerbating factor is movement.  There are relieving factors including immobilization.  Risk factors consist of none.  Prior episodes: chronic.  Therapy today: none.  Associated symptoms: none.  Additional history: none. He also reports left inguinal hernia repair in January and he has been experiencing pain at surgical site for a few days especially with certain movements. He denies abdominal pain, nausea, vomiting, dysuria, fever, and chills.      Referral sent to ortho clinic and he will f/u with his pcp.2:28 PM DISPOSITION: The patient is resting comfortably in no acute distress.  He is hemodynamically stable and is without objective evidence for acute process requiring urgent intervention or hospitalization. I provided counseling to patient with regard to condition, the treatment plan, specific conditions for return, and the importance of follow up. Detailed written and verbal instructions provided to patient and he expressed a verbal understanding of the discharge instructions and overall management plan. Reiterated the importance of medication administration and safety and advised patient to follow up with primary care provider in 3-5 days or sooner if needed.  Answered questions at this time. The patient is stable for discharge.       Amount and/or Complexity of Data Reviewed  Labs: ordered.  Radiology: ordered.    Risk  Prescription drug management.      Additional MDM:   Differential Diagnosis:   At this time differential diagnosis is but not limited to fracture, sprain, contusion              ED Course as of 04/26/24 1429   Fri Apr 26, 2024   1347 CT Abdomen Pelvis With IV Contrast NO Oral Contrast  Impression:     1. No acute abdominopelvic findings.  2. Changes of left inguinal hernia repair without  complication evident.   [RB]   1347 X-Ray Shoulder Complete 2 View Right  Impression:     No acute findings.   [RB]      ED Course User Index  [RB] José Miguel Webb ACNP                           Clinical Impression:  Final diagnoses:  [M25.511] Right shoulder pain  [R10.32] Left groin pain (Primary)          ED Disposition Condition    Discharge Stable          ED Prescriptions       Medication Sig Dispense Start Date End Date Auth. Provider    diclofenac (VOLTAREN) 75 MG EC tablet Take 1 tablet (75 mg total) by mouth 2 (two) times daily as needed (pain). 20 tablet 4/26/2024 -- José Miguel Webb ACNP          Follow-up Information       Follow up With Specialties Details Why Contact Info    Rissa Leal FNP Nurse Practitioner In 3 days  [address] [facility] [address] [phone]      [facility] Dept Emergency Medicine  If symptoms worsen [address] [facility] [address] [phone]    referral sent to orthopedic clinic                 José Miguel Webb ACNP  04/26/24 4668

## 2024-06-11 ENCOUNTER — HOSPITAL ENCOUNTER (EMERGENCY)
Facility: HOSPITAL | Age: 49
Discharge: HOME | End: 2024-06-11
Attending: INTERNAL MEDICINE
Payer: MEDICAID

## 2024-06-11 VITALS
TEMPERATURE: 98 F | WEIGHT: 160.94 LBS | OXYGEN SATURATION: 97 % | SYSTOLIC BLOOD PRESSURE: 128 MMHG | HEART RATE: 77 BPM | RESPIRATION RATE: 16 BRPM | DIASTOLIC BLOOD PRESSURE: 79 MMHG | BODY MASS INDEX: 26.78 KG/M2

## 2024-06-11 DIAGNOSIS — K11.20 SIALADENITIS: Primary | ICD-10-CM

## 2024-06-11 PROCEDURE — 99283 EMERGENCY DEPT VISIT LOW MDM: CPT

## 2024-06-11 RX ORDER — AMOXICILLIN AND CLAVULANATE POTASSIUM 875; 125 MG/1; MG/1
1 TABLET, FILM COATED ORAL 2 TIMES DAILY
Qty: 14 TABLET | Refills: 0 | Status: SHIPPED | OUTPATIENT
Start: 2024-06-11

## 2024-06-11 RX ORDER — IBUPROFEN 600 MG/1
600 TABLET ORAL
Status: DISCONTINUED | OUTPATIENT
Start: 2024-06-11 | End: 2024-06-11 | Stop reason: HOSPADM

## 2024-06-11 NOTE — ED PROVIDER NOTES
Encounter Date: 6/11/2024       History     Chief Complaint   Patient presents with    Facial Swelling     RT SIDED FACIAL SWELLING AND PAIN AROUND EAR X 3 DAYS. DENIES DENTAL PAIN.  FELT POSSIBLE INSECT IN EAR BUT DENIES EAR PAIN AT PRESENT.       Kobe Sidhu is a 47 y/o male with PHM HLD presenting to [facility] ER with a chief complaint of right lower submandibular swelling x 2 days. Patient endorses intermittent mild pain after eating that sometimes radiates to right ear. Additional symptoms include dry mouth. He denies fever, erythema, warmth, difficulty swallowing, difficulty opening his mouth, neck pain, dental pain. He has not taken any medications to help with the pain or swelling. No other complaints.    The history is provided by the patient. No  was used.     Review of patient's allergies indicates:  No Known Allergies  Past Medical History:   Diagnosis Date    Mixed hyperlipidemia     S/P left inguinal hernia repair 01/03/2024     Past Surgical History:   Procedure Laterality Date    APPENDECTOMY  1984    COLONOSCOPY N/A 3/22/2024    Procedure: COLONOSCOPY;  Surgeon: Candido Zimmer MD;  Location: [facility] ENDOSCOPY;  Service: General;  Laterality: N/A;    REPAIR, HERNIA, INGUINAL Left 1/3/2024    Procedure: REPAIR, HERNIA, INGUINAL;  Surgeon: Fredi Degroot Jr., MD;  Location: [facility] OR;  Service: General;  Laterality: Left;     Family History   Problem Relation Name Age of Onset    Cancer Mother      Cancer Maternal Grandmother       Social History     Tobacco Use    Smoking status: Former     Current packs/day: 0.00     Average packs/day: 0.3 packs/day for 1.5 years (0.4 ttl pk-yrs)     Types: Cigarettes     Start date: 07/2019     Quit date: 2021     Years since quitting: 3.4    Smokeless tobacco: Never   Substance Use Topics    Alcohol use: Yes     Alcohol/week: 2.0 standard drinks of alcohol     Types: 2 Cans of beer per week     Comment: only for football    Drug use: Never      Review of Systems   Constitutional: Negative.    HENT:  Positive for ear pain. Negative for sore throat and trouble swallowing.         Right lower jaw/submandibular swelling   Eyes: Negative.    Respiratory: Negative.     Cardiovascular: Negative.    Gastrointestinal: Negative.    Genitourinary: Negative.    Musculoskeletal: Negative.    Skin: Negative.    Neurological: Negative.        Physical Exam     Initial Vitals [06/11/24 1743]   BP Pulse Resp Temp SpO2   128/79 77 16 97.9 °F (36.6 °C) 97 %      MAP       --         Physical Exam    Nursing note and vitals reviewed.  Constitutional: He appears well-developed and well-nourished. He is cooperative.  Non-toxic appearance. He does not have a sickly appearance. He does not appear ill. No distress.   HENT:   Head: Normocephalic and atraumatic.   Right Ear: Hearing, tympanic membrane, external ear and ear canal normal. No mastoid tenderness.   Left Ear: Hearing, tympanic membrane and external ear normal.   Nose: Nose normal.   Mouth/Throat: Uvula is midline and oropharynx is clear and moist. Mucous membranes are dry. He does not have dentures. No oral lesions. No trismus in the jaw. Dental caries present. No dental abscesses or uvula swelling. No oropharyngeal exudate, posterior oropharyngeal edema, posterior oropharyngeal erythema or tonsillar abscesses.   Increased cerum bilaterally  No tenderness, swelling, erythema, discharge noted to bilateral ears  Firm 1.5 cm nodule to right lower submandibular, mildly tender, no erythema, discharge, or fluctuant noted.   Poor oral hygiene with multiple dental caries ranging in decay, several tooth extractions noted in oral cavity. No significant swelling, erythema, or discharge noted in oral cavity.   Patient can fully open mouth, neck supple and has full rom.   Tonsils are normal size and color.    Eyes: Conjunctivae and EOM are normal. Pupils are equal, round, and reactive to light. No scleral icterus.   Neck: Trachea  normal and phonation normal. Neck supple. No thyroid mass and no thyromegaly present. No stridor present. No tracheal tenderness present. No tracheal deviation present. No JVD present.       See media file   Normal range of motion.  Cardiovascular:  Normal rate, regular rhythm, normal heart sounds, intact distal pulses and normal pulses.           Pulmonary/Chest: Effort normal and breath sounds normal. No accessory muscle usage or stridor. No apnea, no tachypnea and no bradypnea. No respiratory distress. He has no decreased breath sounds. He has no wheezes. He has no rhonchi. He has no rales. He exhibits no tenderness.   Normal effort, symmetrical chest rise and fall, no accessory muscle use. Bilateral clear breath sounds in all fields anterior and posterior.      Abdominal: Abdomen is soft. Bowel sounds are normal. He exhibits no distension. There is no abdominal tenderness.   Abdomen is soft, nontender, no peritoneal signs. Tolerating PO fluids.      No right CVA tenderness.  No left CVA tenderness. There is no rebound, no guarding, no tenderness at McBurney's point and negative Smith's sign. negative psoas sign and negative Rovsing's sign  Musculoskeletal:         General: Normal range of motion.      Cervical back: Normal range of motion and neck supple.     Lymphadenopathy:     He has cervical adenopathy.   Neurological: He is alert and oriented to person, place, and time. He has normal strength. Gait normal. GCS score is 15. GCS eye subscore is 4. GCS verbal subscore is 5. GCS motor subscore is 6.   Skin: Skin is warm and dry. Capillary refill takes less than 2 seconds. No rash noted.   Psychiatric: He has a normal mood and affect. His speech is normal and behavior is normal. Judgment and thought content normal. Cognition and memory are normal.         ED Course   Procedures  Labs Reviewed - No data to display       Imaging Results    None          Medications - No data to display  Medical Decision  Lina Sidhu is a 49 y/o male with PHM HLD presenting to [facility] ER with a chief complaint of right lower submandibular swelling x 2 days. Patient endorses intermittent mild pain after eating that sometimes radiates to right ear. Additional symptoms include dry mouth. He denies fever, erythema, warmth, difficulty swallowing, difficulty opening his mouth, neck pain, dental pain. He has not taken any medications to help with the pain or swelling. No other complaints.    Patient eating [employer] chicken in ER room. He denies pain currently.   Educated on oral hygiene, provided dental resources.  Educated on blocked salivary gland and treatment.  Given antibiotics due to multiple dental caries present, history of teeth extractions.   Apply warm compress to the area . Rinse mouth with warm salt water after meals and before bed. Increase water intake, Use sugar free lemon drops to increase the flow of saliva and reduce swelling.     Patient is nontoxic appearing, no acute distress, stable vital signs.   ER precautions given  The patient is resting comfortably in no acute distress.  hemodynamically stable and is without objective evidence for acute process requiring urgent intervention or hospitalization. I provided counseling to patient with regard to condition, the treatment plan, specific conditions for return, and the importance of follow up. Detailed written and verbal instructions provided to patient and he expressed a verbal understanding of the discharge instructions and overall management plan. Reiterated the importance of medication administration and safety and advised patient to follow up with primary care provider in 3-5 days or sooner if needed. Answered questions at this time. The patient is stable for discharge.       Risk  Prescription drug management.      Additional MDM:   Differential Diagnosis:   Dental abscess, facial bones infections, soft tissue facial infections, osteomyelitis, facial bones  fractures, among others                                      Clinical Impression:  Final diagnoses:  [K11.20] Sialadenitis (Primary)          ED Disposition Condition    Discharge Stable          ED Prescriptions       Medication Sig Dispense Start Date End Date Auth. Provider    amoxicillin-clavulanate 875-125mg (AUGMENTIN) 875-125 mg per tablet Take 1 tablet by mouth 2 (two) times daily. 14 tablet 6/11/2024 -- Spring Jean FNP          Follow-up Information       Follow up With Specialties Details Why Contact Info    Rissa Leal FNP Internal Medicine In 3 days  [address] [facility] [address] [phone]      [facility] Emergency Dept Emergency Medicine  If symptoms worsen [address] [facility] [address] [phone]    Make dentist appointment                 Spring Jean FNP  06/12/24 8074

## 2024-06-11 NOTE — DISCHARGE INSTRUCTIONS
Apply warm compress to the area . Rinse mouth with warm salt water after meals and before bed. Increase water intake, Use sugar free lemon drops to increase the flow of saliva and reduce swelling. Dental resources sheet provided, make appointment for follow up. Return for fever, difficulty swallowing  or worsening symptoms.

## 2024-06-14 ENCOUNTER — HOSPITAL ENCOUNTER (EMERGENCY)
Facility: HOSPITAL | Age: 49
Discharge: HOME | End: 2024-06-14
Attending: STUDENT IN AN ORGANIZED HEALTH CARE EDUCATION/TRAINING PROGRAM
Payer: COMMERCIAL

## 2024-06-14 VITALS
DIASTOLIC BLOOD PRESSURE: 84 MMHG | OXYGEN SATURATION: 99 % | HEIGHT: 65 IN | WEIGHT: 170 LBS | SYSTOLIC BLOOD PRESSURE: 134 MMHG | TEMPERATURE: 99 F | BODY MASS INDEX: 28.32 KG/M2 | RESPIRATION RATE: 18 BRPM | HEART RATE: 62 BPM

## 2024-06-14 DIAGNOSIS — S39.012A STRAIN OF LUMBAR REGION, INITIAL ENCOUNTER: ICD-10-CM

## 2024-06-14 DIAGNOSIS — Z04.1 ENCOUNTER FOR EXAMINATION FOLLOWING MOTOR VEHICLE COLLISION (MVC): Primary | ICD-10-CM

## 2024-06-14 PROCEDURE — 99283 EMERGENCY DEPT VISIT LOW MDM: CPT | Mod: 25

## 2024-06-14 PROCEDURE — 25000003 PHARM REV CODE 250: Performed by: STUDENT IN AN ORGANIZED HEALTH CARE EDUCATION/TRAINING PROGRAM

## 2024-06-14 RX ORDER — ACETAMINOPHEN 500 MG
1000 TABLET ORAL
Status: COMPLETED | OUTPATIENT
Start: 2024-06-14 | End: 2024-06-14

## 2024-06-14 RX ADMIN — ACETAMINOPHEN 1000 MG: 500 TABLET ORAL at 12:06
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
## 2024-10-26 ENCOUNTER — HOSPITAL ENCOUNTER (EMERGENCY)
Facility: HOSPITAL | Age: 49
Discharge: HOME | End: 2024-10-26
Attending: INTERNAL MEDICINE
Payer: MEDICAID

## 2024-10-26 VITALS
BODY MASS INDEX: 27.82 KG/M2 | WEIGHT: 167 LBS | HEART RATE: 68 BPM | DIASTOLIC BLOOD PRESSURE: 82 MMHG | HEIGHT: 65 IN | OXYGEN SATURATION: 100 % | RESPIRATION RATE: 18 BRPM | SYSTOLIC BLOOD PRESSURE: 118 MMHG | TEMPERATURE: 98 F

## 2024-10-26 DIAGNOSIS — M79.5 FOREIGN BODY (FB) IN SOFT TISSUE: ICD-10-CM

## 2024-10-26 DIAGNOSIS — L24.0 IRRITANT CONTACT DERMATITIS DUE TO DETERGENT: ICD-10-CM

## 2024-10-26 DIAGNOSIS — L08.9 INFECTION OF FINGER: Primary | ICD-10-CM

## 2024-10-26 PROCEDURE — 99283 EMERGENCY DEPT VISIT LOW MDM: CPT | Mod: 25

## 2024-10-26 RX ORDER — SULFAMETHOXAZOLE AND TRIMETHOPRIM 800; 160 MG/1; MG/1
1 TABLET ORAL 2 TIMES DAILY
Qty: 14 TABLET | Refills: 0 | Status: SHIPPED | OUTPATIENT
Start: 2024-10-26 | End: 2024-11-02

## 2024-11-02 ENCOUNTER — HOSPITAL ENCOUNTER (EMERGENCY)
Facility: HOSPITAL | Age: 49
Discharge: HOME | End: 2024-11-02
Attending: INTERNAL MEDICINE
Payer: MEDICAID

## 2024-11-02 VITALS
HEART RATE: 78 BPM | OXYGEN SATURATION: 99 % | SYSTOLIC BLOOD PRESSURE: 110 MMHG | WEIGHT: 171.75 LBS | TEMPERATURE: 98 F | BODY MASS INDEX: 28.61 KG/M2 | DIASTOLIC BLOOD PRESSURE: 75 MMHG | HEIGHT: 65 IN | RESPIRATION RATE: 18 BRPM

## 2024-11-02 DIAGNOSIS — L84 CALLUS OF PALM OF HAND: ICD-10-CM

## 2024-11-02 DIAGNOSIS — Z76.0 ENCOUNTER FOR MEDICATION REFILL: Primary | ICD-10-CM

## 2024-11-02 DIAGNOSIS — L08.9 FINGER INFECTION: ICD-10-CM

## 2024-11-02 PROCEDURE — 99281 EMR DPT VST MAYX REQ PHY/QHP: CPT

## 2024-11-02 RX ORDER — SULFAMETHOXAZOLE AND TRIMETHOPRIM 800; 160 MG/1; MG/1
1 TABLET ORAL 2 TIMES DAILY
Qty: 14 TABLET | Refills: 0 | Status: SHIPPED | OUTPATIENT
Start: 2024-11-02 | End: 2024-11-09

## 2024-11-25 ENCOUNTER — HOSPITAL ENCOUNTER (EMERGENCY)
Facility: HOSPITAL | Age: 49
Discharge: HOME | End: 2024-11-26
Attending: INTERNAL MEDICINE
Payer: MEDICAID

## 2024-11-25 DIAGNOSIS — N42.83 CYST OF PROSTATE: ICD-10-CM

## 2024-11-25 DIAGNOSIS — E87.6 HYPOKALEMIA: Primary | ICD-10-CM

## 2024-11-25 DIAGNOSIS — R10.32 LEFT GROIN PAIN: ICD-10-CM

## 2024-11-25 LAB
ALBUMIN SERPL-MCNC: 3.2 G/DL (ref 3.5–5)
ALBUMIN/GLOB SERPL: 0.9 RATIO (ref 1.1–2)
ALP SERPL-CCNC: 67 UNIT/L (ref 40–150)
ALT SERPL-CCNC: 22 UNIT/L (ref 0–55)
ANION GAP SERPL CALC-SCNC: 9 MEQ/L
AST SERPL-CCNC: 32 UNIT/L (ref 5–34)
BILIRUB SERPL-MCNC: 0.4 MG/DL
BUN SERPL-MCNC: 10.3 MG/DL (ref 8.9–20.6)
CALCIUM SERPL-MCNC: 9 MG/DL (ref 8.4–10.2)
CHLORIDE SERPL-SCNC: 106 MMOL/L (ref 98–107)
CO2 SERPL-SCNC: 24 MMOL/L (ref 22–29)
CREAT SERPL-MCNC: 1.11 MG/DL (ref 0.72–1.25)
CREAT/UREA NIT SERPL: 9
CRP SERPL-MCNC: 43.7 MG/L
GFR SERPLBLD CREATININE-BSD FMLA CKD-EPI: >60 ML/MIN/1.73/M2
GLOBULIN SER-MCNC: 3.6 GM/DL (ref 2.4–3.5)
GLUCOSE SERPL-MCNC: 104 MG/DL (ref 74–100)
MAGNESIUM SERPL-MCNC: 1.9 MG/DL (ref 1.6–2.6)
POTASSIUM SERPL-SCNC: 3.2 MMOL/L (ref 3.5–5.1)
PROT SERPL-MCNC: 6.8 GM/DL (ref 6.4–8.3)
SODIUM SERPL-SCNC: 139 MMOL/L (ref 136–145)

## 2024-11-25 PROCEDURE — 83735 ASSAY OF MAGNESIUM: CPT | Performed by: INTERNAL MEDICINE

## 2024-11-25 PROCEDURE — 99284 EMERGENCY DEPT VISIT MOD MDM: CPT

## 2024-11-25 PROCEDURE — 86140 C-REACTIVE PROTEIN: CPT | Performed by: INTERNAL MEDICINE

## 2024-11-25 PROCEDURE — 80053 COMPREHEN METABOLIC PANEL: CPT | Performed by: INTERNAL MEDICINE

## 2024-11-25 RX ORDER — LANOLIN ALCOHOL/MO/W.PET/CERES
400 CREAM (GRAM) TOPICAL ONCE
Status: COMPLETED | OUTPATIENT
Start: 2024-11-26 | End: 2024-11-26

## 2024-11-26 VITALS
WEIGHT: 169.56 LBS | BODY MASS INDEX: 28.25 KG/M2 | RESPIRATION RATE: 18 BRPM | DIASTOLIC BLOOD PRESSURE: 79 MMHG | HEART RATE: 75 BPM | OXYGEN SATURATION: 99 % | HEIGHT: 65 IN | TEMPERATURE: 98 F | SYSTOLIC BLOOD PRESSURE: 105 MMHG

## 2024-11-26 LAB
BACTERIA #/AREA URNS AUTO: ABNORMAL /HPF
BILIRUB UR QL STRIP.AUTO: NEGATIVE
CLARITY UR: CLEAR
COLOR UR AUTO: YELLOW
GLUCOSE UR QL STRIP: NORMAL
HGB UR QL STRIP: ABNORMAL
HYALINE CASTS #/AREA URNS LPF: ABNORMAL /LPF
KETONES UR QL STRIP: NEGATIVE
LEUKOCYTE ESTERASE UR QL STRIP: NEGATIVE
MUCOUS THREADS URNS QL MICRO: ABNORMAL /LPF
NITRITE UR QL STRIP: NEGATIVE
PH UR STRIP: 5.5 [PH]
PROT UR QL STRIP: ABNORMAL
RBC #/AREA URNS AUTO: ABNORMAL /HPF
SP GR UR STRIP.AUTO: 1.03 (ref 1–1.03)
SQUAMOUS #/AREA URNS LPF: ABNORMAL /HPF
UROBILINOGEN UR STRIP-ACNC: NORMAL
WBC #/AREA URNS AUTO: ABNORMAL /HPF

## 2024-11-26 PROCEDURE — 81001 URINALYSIS AUTO W/SCOPE: CPT | Performed by: INTERNAL MEDICINE

## 2024-11-26 PROCEDURE — 25000003 PHARM REV CODE 250: Performed by: INTERNAL MEDICINE

## 2024-11-26 RX ADMIN — POTASSIUM BICARBONATE 50 MEQ: 977.5 TABLET, EFFERVESCENT ORAL at 12:11

## 2024-11-26 RX ADMIN — Medication 400 MG: at 12:11

## 2024-11-26 NOTE — ED PROVIDER NOTES
"Encounter Date: 11/25/2024       History     Chief Complaint   Patient presents with    Groin Pain    Hand Pain     Presents with intermittent left groin area pain. States history of hernia repair years ago but have not seen swelling. Denies rash, testicular pain, dysuria, hematuria or BM problems. Asymptomatic at this time. Pt also concern about feeling his right hand "twitching" for several weeks now. 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     The history is provided by the patient and the spouse.     Review of patient's allergies indicates:  No Known Allergies  Past Medical History:   Diagnosis Date    Mixed hyperlipidemia     S/P left inguinal hernia repair 01/03/2024     Past Surgical History:   Procedure Laterality Date    APPENDECTOMY  1984    COLONOSCOPY N/A 3/22/2024    Procedure: COLONOSCOPY;  Surgeon: Candido Zimmer MD;  Location: [facility] ENDOSCOPY;  Service: General;  Laterality: N/A;    REPAIR, HERNIA, INGUINAL Left 1/3/2024    Procedure: REPAIR, HERNIA, INGUINAL;  Surgeon: Fredi Degroot Jr., MD;  Location: [facility] OR;  Service: General;  Laterality: Left;     Family History   Problem Relation Name Age of Onset    Cancer Mother      Cancer Maternal Grandmother       Social History     Tobacco Use    Smoking status: Former     Current packs/day: 0.00     Average packs/day: 0.3 packs/day for 1.5 years (0.4 ttl pk-yrs)     Types: Cigarettes     Start date: 07/2019     Quit date: 2021     Years since quitting: 3.9    Smokeless tobacco: Never   Substance Use Topics    Alcohol use: Yes     Alcohol/week: 2.0 standard drinks of alcohol     Types: 2 Cans of beer per week     Comment: only for football    Drug use: Never     Review of Systems   All other systems reviewed and are negative.      Physical Exam     Initial Vitals [11/25/24 2208]   BP Pulse Resp Temp SpO2   112/76 70 20 98 °F (36.7 " °C) 100 %      MAP       --         Physical Exam    Nursing note and vitals reviewed.  Constitutional: He appears well-developed and well-nourished. No distress.   HENT:   Head: Normocephalic and atraumatic.   Eyes: Conjunctivae are normal. Pupils are equal, round, and reactive to light.   Neck: Neck supple.   Normal range of motion.  Cardiovascular:  Normal rate, regular rhythm, normal heart sounds and intact distal pulses.           Pulmonary/Chest: Breath sounds normal. No respiratory distress.   Abdominal: Abdomen is soft. Bowel sounds are normal. He exhibits no distension. There is no abdominal tenderness. There is no rebound and no guarding.   Genitourinary:    Penis normal.   No discharge found.    Genitourinary Comments: No palpable hernia defect  No LAD  Non tender  Surgical scar noticed     Musculoskeletal:         General: No edema. Normal range of motion.      Cervical back: Normal range of motion and neck supple.     Neurological: He is alert and oriented to person, place, and time. He has normal strength. GCS score is 15. GCS eye subscore is 4. GCS verbal subscore is 5. GCS motor subscore is 6.   Skin: Skin is warm and dry. No rash noted. No erythema.   Dry skin on hands   Psychiatric: His behavior is normal. Thought content normal.         ED Course   Procedures  Labs Reviewed   URINALYSIS, REFLEX TO URINE CULTURE - Abnormal       Result Value    Color, UA Yellow      Appearance, UA Clear      Specific Gravity, UA 1.031 (*)     pH, UA 5.5      Protein, UA Trace (*)     Glucose, UA Normal      Ketones, UA Negative      Blood, UA 1+ (*)     Bilirubin, UA Negative      Urobilinogen, UA Normal      Nitrites, UA Negative      Leukocyte Esterase, UA Negative      RBC, UA 6-10 (*)     WBC, UA 0-5      Bacteria, UA None Seen      Squamous Epithelial Cells, UA Trace (*)     Mucous, UA Trace (*)     Hyaline Casts, UA None Seen     COMPREHENSIVE METABOLIC PANEL - Abnormal    Sodium 139      Potassium 3.2 (*)      Chloride 106      CO2 24      Glucose 104 (*)     Blood Urea Nitrogen 10.3      Creatinine 1.11      Calcium 9.0      Protein Total 6.8      Albumin 3.2 (*)     Globulin 3.6 (*)     Albumin/Globulin Ratio 0.9 (*)     Bilirubin Total 0.4      ALP 67      ALT 22      AST 32      eGFR >60      Anion Gap 9.0      BUN/Creatinine Ratio 9     C-REACTIVE PROTEIN - Abnormal    CRP 43.70 (*)    MAGNESIUM - Normal    Magnesium Level 1.90            Imaging Results              CT Abdomen Pelvis  Without Contrast (Preliminary result)  Result time 11/26/24 00:58:32      Preliminary result by Genaro Londono MD (11/26/24 00:58:32)                   Narrative:    START OF REPORT:  Technique: CT of the abdomen and pelvis was performed with axial images as well as sagittal and coronal reconstruction images without intravenous contrast.    Comparison: Comparison is with study dated 2024-04-26 12:59:30.    Clinical History: Groin and hand pain.    Dosage Information: Automated Exposure Control was utilized 287.46 mGy.cm.    Findings:  Lines and Tubes: None.  Thorax:  Lungs: There is minimal nonspecific dependent change at the lung bases. No focal infiltrate or consolidation is seen.  Pleura: No effusions or thickening are seen.  Heart: The heart size is within normal limits.  Abdomen:  Abdominal Wall: No abdominal wall pathology is seen.  Liver: The liver appears unremarkable.  Biliary System: No intrahepatic or extrahepatic biliary duct dilatation is seen.  Gallbladder: The gallbladder appears unremarkable with no stones wall thickening or pericholecystic inflammatory changes or fluid.  Pancreas: The pancreas appears unremarkable.  Spleen: The spleen appears unremarkable.  Adrenals: The adrenal glands appear unremarkable.  Kidneys: The kidneys appear unremarkable with no stones cysts masses or hydronephrosis.  Aorta: The abdominal aorta appears unremarkable.  IVC: Unremarkable.  Bowel:  Esophagus: The visualized esophagus appears  unremarkable.  Stomach: The stomach appears unremarkable.  Duodenum: Unremarkable appearing duodenum.  Small Bowel: The small bowel appears unremarkable.  Colon: There is moderate stool in the cecum ascending colon which could reflect an element of constipation.  Peritoneum: No intraperitoneal free air or ascites is seen.    Pelvis:  Bladder: The bladder is nondistended and cannot be definitively evaluated.  Male:  Prostate gland: A 3.5 cm grossly stable cystic region is again seen in the midline prostate region (image 129 series 2).    Bony structures:  Dorsal Spine: There is mild scattered spondylosis of the visualized dorsal spine.  Bony Pelvis: The visualized bony structures of the pelvis appear unremarkable.      Impression:  1. A 3.5 cm grossly stable cystic region is again seen in the midline prostate region (image 129 series 2). Correlate clinically as regards additional evaluation and follow up.  2. No acute intraabdominal or pelvic solid organ or bowel pathology identified. Details and other findings as discussed above.                                         Medications   potassium bicarbonate disintegrating tablet 50 mEq (50 mEq Oral Given 11/26/24 0016)   magnesium oxide tablet 400 mg (400 mg Oral Given 11/26/24 0017)     Medical Decision Making  Amount and/or Complexity of Data Reviewed  Labs: ordered. Decision-making details documented in ED Course.                                      Clinical Impression:  Final diagnoses:  [E87.6] Hypokalemia (Primary)  [R10.32] Left groin pain  [N42.83] Cyst of prostate          ED Disposition Condition    Discharge Stable          ED Prescriptions    None       Follow-up Information       Follow up With Specialties Details Why Contact Rissa Ahmadi FNP Internal Medicine Schedule an appointment as soon as possible for a visit in 2 weeks  [address] [facility] [address] [phone]      [facility] Emergency Medicine   If symptoms worsen [address] [facility] [address] [phone]    Genet Mcmahon, DO Urology Schedule an appointment as soon as possible for a visit in 1 month  [address] [facility] [address]  [phone]               Irvin Interiano MD  11/26/24 0137

## 2024-12-12 ENCOUNTER — HOSPITAL ENCOUNTER (EMERGENCY)
Facility: HOSPITAL | Age: 49
Discharge: HOME | End: 2024-12-12
Attending: STUDENT IN AN ORGANIZED HEALTH CARE EDUCATION/TRAINING PROGRAM
Payer: MEDICAID

## 2024-12-12 VITALS
TEMPERATURE: 98 F | HEART RATE: 65 BPM | HEIGHT: 65 IN | DIASTOLIC BLOOD PRESSURE: 89 MMHG | OXYGEN SATURATION: 100 % | BODY MASS INDEX: 27.92 KG/M2 | RESPIRATION RATE: 20 BRPM | SYSTOLIC BLOOD PRESSURE: 127 MMHG | WEIGHT: 167.56 LBS

## 2024-12-12 DIAGNOSIS — M79.642 LEFT HAND PAIN: Primary | ICD-10-CM

## 2024-12-12 DIAGNOSIS — M50.30 DEGENERATIVE CERVICAL DISC: ICD-10-CM

## 2024-12-12 DIAGNOSIS — Z72.0 TOBACCO ABUSE: ICD-10-CM

## 2024-12-12 PROCEDURE — 99283 EMERGENCY DEPT VISIT LOW MDM: CPT | Mod: 25

## 2024-12-12 RX ORDER — GABAPENTIN 100 MG/1
100 CAPSULE ORAL NIGHTLY
Qty: 5 CAPSULE | Refills: 0 | Status: CANCELLED | OUTPATIENT
Start: 2024-12-12 | End: 2024-12-17

## 2024-12-12 RX ORDER — MELOXICAM 7.5 MG/1
7.5 TABLET ORAL DAILY
Qty: 10 TABLET | Refills: 0 | Status: CANCELLED | OUTPATIENT
Start: 2024-12-12 | End: 2024-12-22

## 2024-12-12 RX ORDER — METHOCARBAMOL 500 MG/1
1000 TABLET, FILM COATED ORAL 3 TIMES DAILY
Qty: 42 TABLET | Refills: 0 | Status: CANCELLED | OUTPATIENT
Start: 2024-12-12 | End: 2024-12-19

## 2024-12-12 NOTE — Clinical Note
"Kobe"Mahendra Sidhu was seen and treated in our emergency department on 12/12/2024.  He may return to work on 12/14/2024.       If you have any questions or concerns, please don't hesitate to call.      Spring Jean, KERA"

## 2024-12-13 NOTE — DISCHARGE INSTRUCTIONS
Rissa Leal is your Primary Care Doctor: Contact her to make an appointment  [address] [facility] [address] [phone]   Stop smoking  Wear wrist brace (get at pharmacy or [facility])

## 2024-12-13 NOTE — ED PROVIDER NOTES
Encounter Date: 12/12/2024       History     Chief Complaint   Patient presents with    Numbness     Intermittent left hand numbness x2 weeks. States was seen here recently for same. No distress.     The history is provided by the patient.     Review of patient's allergies indicates:  No Known Allergies  Past Medical History:   Diagnosis Date    Mixed hyperlipidemia     S/P left inguinal hernia repair 01/03/2024     Past Surgical History:   Procedure Laterality Date    APPENDECTOMY  1984    COLONOSCOPY N/A 3/22/2024    Procedure: COLONOSCOPY;  Surgeon: Candido Zimmer MD;  Location: [facility] ENDOSCOPY;  Service: General;  Laterality: N/A;    REPAIR, HERNIA, INGUINAL Left 1/3/2024    Procedure: REPAIR, HERNIA, INGUINAL;  Surgeon: Fredi Degroot Jr., MD;  Location: [facility] OR;  Service: General;  Laterality: Left;     Family History   Problem Relation Name Age of Onset    Cancer Mother      Cancer Maternal Grandmother       Social History     Tobacco Use    Smoking status: Former     Current packs/day: 0.00     Average packs/day: 0.3 packs/day for 1.5 years (0.4 ttl pk-yrs)     Types: Cigarettes     Start date: 07/2019     Quit date: 2021     Years since quitting: 3.9    Smokeless tobacco: Never   Substance Use Topics    Alcohol use: Yes     Alcohol/week: 2.0 standard drinks of alcohol     Types: 2 Cans of beer per week     Comment: only for football    Drug use: Never     Review of Systems   Constitutional: Negative.    HENT: Negative.     Eyes: Negative.    Respiratory: Negative.     Cardiovascular: Negative.    Gastrointestinal: Negative.    Genitourinary: Negative.    Musculoskeletal:  Positive for arthralgias. Negative for back pain, gait problem, joint swelling, myalgias, neck pain and neck stiffness.        Left hand internal twitching up toward left elbow over three weeks   Skin: Negative.    Neurological: Negative.        Physical Exam     Initial Vitals [12/12/24 1800]   BP Pulse Resp Temp SpO2   112/72  70 20 97.9 °F (36.6 °C) 99 %      MAP       --         Physical Exam    Nursing note and vitals reviewed.  Constitutional: He appears well-developed and well-nourished. He is not diaphoretic. He is cooperative.  Non-toxic appearance. He does not have a sickly appearance. He does not appear ill. No distress.   HENT:   Head: Normocephalic and atraumatic.   Right Ear: Hearing, tympanic membrane and external ear normal.   Left Ear: Hearing, tympanic membrane and external ear normal.   Nose: Nose normal. Mouth/Throat: Uvula is midline, oropharynx is clear and moist and mucous membranes are normal.   Eyes: Conjunctivae and EOM are normal. Pupils are equal, round, and reactive to light. No scleral icterus.   Neck: Trachea normal and phonation normal. Neck supple.   Normal range of motion.  Cardiovascular:  Normal rate, regular rhythm, normal heart sounds, intact distal pulses and normal pulses.           Pulmonary/Chest: Effort normal and breath sounds normal. No accessory muscle usage. No apnea, no tachypnea and no bradypnea. No respiratory distress. He has no decreased breath sounds. He has no wheezes. He has no rhonchi. He has no rales. He exhibits no tenderness.   Normal effort, symmetrical chest rise and fall, no accessory muscle use. Bilateral clear breath sounds in all fields anterior and posterior.      Abdominal: Abdomen is soft. Bowel sounds are normal. He exhibits no distension. There is no abdominal tenderness.   Abdomen is soft, nontender, no peritoneal signs. Tolerating PO fluids.      No right CVA tenderness.  No left CVA tenderness. There is no rebound, no guarding, no tenderness at McBurney's point and negative Smith's sign. negative psoas sign and negative Rovsing's sign  Musculoskeletal:         General: Normal range of motion.      Right shoulder: Normal.      Left shoulder: Normal.      Right upper arm: Normal.      Left upper arm: Normal.      Right elbow: Normal.      Left elbow: Normal.      Right  "forearm: Normal.      Left forearm: Normal.      Right wrist: Normal.      Left wrist: Normal.      Right hand: Normal.      Left hand: Normal.      Cervical back: Normal range of motion and neck supple.      Comments: Upper extremities are equal in size, left UE is pink,warm, sensation intact, cap refill < 2 sec, skin intact, strength equal 5/5, radial pulse +2.      Neurological: He is alert and oriented to person, place, and time. He has normal strength. Gait normal. GCS score is 15. GCS eye subscore is 4. GCS verbal subscore is 5. GCS motor subscore is 6.   Skin: Skin is warm and dry. Capillary refill takes less than 2 seconds. No rash noted. No erythema. No pallor.   Psychiatric: He has a normal mood and affect. His speech is normal and behavior is normal. Judgment and thought content normal. Cognition and memory are normal.         ED Course   Procedures  Labs Reviewed - No data to display       Imaging Results              X-Ray Cervical Spine AP And Lateral (Final result)  Result time 12/12/24 21:50:21      Final result by Varun David MD (12/12/24 21:50:21)                   Impression:      Degenerative changes, no fractures are seen      Electronically signed by: Varun David MD  Date:    12/12/2024  Time:    21:50               Narrative:    EXAMINATION:  XR CERVICAL SPINE AP LATERAL    CLINICAL HISTORY:  Pain;    TECHNIQUE:  AP, lateral and open mouth views of the cervical spine were performed.    COMPARISON:  03/29/2018    FINDINGS:  There degenerative changes at C3-4 C4-5 C5-6.  There is kyphosis present.  The odontoid is intact.                        Wet Read by Spring Jean FNP (12/12/24 20:17:24, [facility], Emergency Medicine)    No acute abnormalities, degenerative changes.                                     Medications - No data to display  Medical Decision Making  Fracture, septic joint, cellulitis, abscess, gout, RA, OA among others    Left hand "twitching" " up toward elbow for weeks    Clinical exam negative.   Xray Cervical Degenerative changes    Provided short dose of antiinflammatory and gabapentin. Educated to monitor symptoms, schedule follow up with PCP. ED precautions discussed. Patient is nontoxic appearing, no acute distress, stable vital signs.   The patient is resting comfortably in no acute distress.  hemodynamically stable and is without objective evidence for acute process requiring urgent intervention or hospitalization. I provided counseling to patient with regard to condition, the treatment plan, specific conditions for return, and the importance of follow up. Detailed written and verbal instructions provided to patient and he expressed a verbal understanding of the discharge instructions and overall management plan. Reiterated the importance of medication administration and safety and advised patient to follow up with primary care provider in 3-5 days or sooner if needed. Answered questions at this time. The patient is stable for discharge.       Amount and/or Complexity of Data Reviewed  Radiology: ordered and independent interpretation performed.                                      Clinical Impression:  Final diagnoses:  [Z72.0] Tobacco abuse  [M79.642] Left hand pain (Primary)  [M50.30] Degenerative cervical disc          ED Disposition Condition    Discharge Stable          ED Prescriptions    None       Follow-up Information       Follow up With Specialties Details Why Contact Info    Rissa Leal FNP Internal Medicine In 3 days  [address] [facility] [address]  [phone]      [facility] Emergency Medicine  If symptoms worsen [address] [facility] [id number] [phone]             Spring Jean FNP  12/13/24 1568

## 2025-01-08 ENCOUNTER — HOSPITAL ENCOUNTER (EMERGENCY)
Facility: HOSPITAL | Age: 50
Discharge: HOME | End: 2025-01-08
Attending: EMERGENCY MEDICINE
Payer: MEDICAID

## 2025-01-08 VITALS
TEMPERATURE: 97 F | OXYGEN SATURATION: 99 % | HEART RATE: 69 BPM | WEIGHT: 166.75 LBS | HEIGHT: 66 IN | SYSTOLIC BLOOD PRESSURE: 114 MMHG | RESPIRATION RATE: 18 BRPM | DIASTOLIC BLOOD PRESSURE: 75 MMHG | BODY MASS INDEX: 26.8 KG/M2

## 2025-01-08 DIAGNOSIS — M62.838 MUSCLE SPASM: Primary | ICD-10-CM

## 2025-01-08 LAB
ALBUMIN SERPL-MCNC: 3.5 G/DL (ref 3.5–5)
ALBUMIN/GLOB SERPL: 0.9 RATIO (ref 1.1–2)
ALP SERPL-CCNC: 77 UNIT/L (ref 40–150)
ALT SERPL-CCNC: 16 UNIT/L (ref 0–55)
ANION GAP SERPL CALC-SCNC: 9 MEQ/L
AST SERPL-CCNC: 21 UNIT/L (ref 5–34)
BILIRUB SERPL-MCNC: 0.8 MG/DL
BUN SERPL-MCNC: 15.9 MG/DL (ref 8.9–20.6)
CALCIUM SERPL-MCNC: 9.3 MG/DL (ref 8.4–10.2)
CHLORIDE SERPL-SCNC: 109 MMOL/L (ref 98–107)
CO2 SERPL-SCNC: 24 MMOL/L (ref 22–29)
CREAT SERPL-MCNC: 0.94 MG/DL (ref 0.72–1.25)
CREAT/UREA NIT SERPL: 17
GFR SERPLBLD CREATININE-BSD FMLA CKD-EPI: >60 ML/MIN/1.73/M2
GLOBULIN SER-MCNC: 3.8 GM/DL (ref 2.4–3.5)
GLUCOSE SERPL-MCNC: 124 MG/DL (ref 74–100)
POTASSIUM SERPL-SCNC: 3.6 MMOL/L (ref 3.5–5.1)
PROT SERPL-MCNC: 7.3 GM/DL (ref 6.4–8.3)
SODIUM SERPL-SCNC: 142 MMOL/L (ref 136–145)

## 2025-01-08 PROCEDURE — 99283 EMERGENCY DEPT VISIT LOW MDM: CPT

## 2025-01-08 PROCEDURE — 80053 COMPREHEN METABOLIC PANEL: CPT

## 2025-01-08 RX ORDER — BACLOFEN 20 MG/1
20 TABLET ORAL 3 TIMES DAILY
Qty: 21 TABLET | Refills: 0 | Status: SHIPPED | OUTPATIENT
Start: 2025-01-08 | End: 2025-01-26

## 2025-01-08 NOTE — Clinical Note
"Kobe"Mahendra Sidhu was seen and treated in our emergency department on 1/8/2025.  He may return to work on 01/09/2025.       If you have any questions or concerns, please don't hesitate to call.      Cynthia Anderson PA"

## 2025-01-09 NOTE — ED PROVIDER NOTES
"Encounter Date: 2025       History     Chief Complaint   Patient presents with    Tremors     Patient reports intermittent left arm tremors x 1 month. +2 radial pulse noted to left upper extremity. He reports his symptoms subsided after taking tramadol.      A 49 y.o. male patient with a history of HLD presents to the ED with muscle spasms/cramps of the left hand. The onset is 2 months ago. Location is left hand. It is characterized as "tremors when I go to sleep at night". Associated symptoms: none. It is constant. Alleviating factors: none. Aggravating factors: none, it is random and it is not characterized by shaking or tremors when demonstrated. It appears . Severity is Mild. Risk factors include previous hypokalemia. Denies fever, chills, N/V, recent illness, palpitations, CP, SOB.        The history is provided by the patient.     Review of patient's allergies indicates:  No Known Allergies  Past Medical History:   Diagnosis Date    Mixed hyperlipidemia     S/P left inguinal hernia repair 2024     Past Surgical History:   Procedure Laterality Date    APPENDECTOMY      COLONOSCOPY N/A 3/22/2024    Procedure: COLONOSCOPY;  Surgeon: Candido Zimmer MD;  Location: [facility] ENDOSCOPY;  Service: General;  Laterality: N/A;    REPAIR, HERNIA, INGUINAL Left 1/3/2024    Procedure: REPAIR, HERNIA, INGUINAL;  Surgeon: Fredi Degroot Jr., MD;  Location: [facility] OR;  Service: General;  Laterality: Left;     Family History   Problem Relation Name Age of Onset    Cancer Mother      Cancer Maternal Grandmother       Social History     Tobacco Use    Smoking status: Former     Current packs/day: 0.00     Average packs/day: 0.3 packs/day for 1.5 years (0.4 ttl pk-yrs)     Types: Cigarettes     Start date: 2019     Quit date:      Years since quittin.0    Smokeless tobacco: Never   Substance Use Topics    Alcohol use: Yes     Alcohol/week: 2.0 standard drinks of alcohol     Types: 2 Cans of beer per " week     Comment: only for football    Drug use: Never     Review of Systems   Constitutional:  Negative for chills and fever.   Eyes:  Negative for visual disturbance.   Respiratory:  Negative for shortness of breath.    Cardiovascular:  Negative for chest pain.   Gastrointestinal:  Negative for nausea and vomiting.   Genitourinary:  Negative for difficulty urinating and dysuria.   Musculoskeletal:  Negative for arthralgias.        Spasms   Skin:  Negative for color change and rash.   Neurological:  Negative for weakness and headaches.   Hematological:  Does not bruise/bleed easily.   Psychiatric/Behavioral:  Negative for confusion.    All other systems reviewed and are negative.      Physical Exam     Initial Vitals [01/08/25 1921]   BP Pulse Resp Temp SpO2   (!) 153/78 86 18 97.3 °F (36.3 °C) 98 %      MAP       --         Physical Exam    Nursing note and vitals reviewed.  Constitutional: He appears well-developed and well-nourished.   HENT:   Head: Normocephalic and atraumatic.   Right Ear: External ear normal.   Left Ear: External ear normal.   Nose: Nose normal.   Eyes: Conjunctivae and EOM are normal.   Neck: Neck supple.   Cardiovascular:  Normal rate, regular rhythm and normal heart sounds.     Exam reveals no gallop and no friction rub.       No murmur heard.  Pulmonary/Chest: Breath sounds normal. No respiratory distress. He has no wheezes. He has no rhonchi. He has no rales.   Abdominal: He exhibits no distension.   Musculoskeletal:      Left hand: No swelling, deformity, lacerations, tenderness or bony tenderness. Normal range of motion. Normal strength. Normal sensation. There is no disruption of two-point discrimination. Normal capillary refill. Normal pulse.      Cervical back: Neck supple.      Comments: Spasms of left pinky noted when patient states the episode is occuring     Neurological: He is alert and oriented to person, place, and time. GCS eye subscore is 4. GCS verbal subscore is 5. GCS  "motor subscore is 6.   Skin: Skin is warm and dry. Capillary refill takes less than 2 seconds.         ED Course   Procedures  Labs Reviewed   COMPREHENSIVE METABOLIC PANEL - Abnormal       Result Value    Sodium 142      Potassium 3.6      Chloride 109 (*)     CO2 24      Glucose 124 (*)     Blood Urea Nitrogen 15.9      Creatinine 0.94      Calcium 9.3      Protein Total 7.3      Albumin 3.5      Globulin 3.8 (*)     Albumin/Globulin Ratio 0.9 (*)     Bilirubin Total 0.8      ALP 77      ALT 16      AST 21      eGFR >60      Anion Gap 9.0      BUN/Creatinine Ratio 17            Imaging Results    None          Medications - No data to display  Medical Decision Making  A 49 y.o. male patient with a history of HLD presents to the ED with muscle spasms/cramps of the left hand. The onset is 2 months ago. Location is left hand. It is characterized as "tremors when I go to sleep at night". Associated symptoms: none. It is constant. Alleviating factors: none. Aggravating factors: none, it is random and it is not characterized by shaking or tremors when demonstrated. It appears . Severity is Mild. Risk factors include previous hypokalemia. Denies fever, chills, N/V, recent illness, palpitations, CP, SOB.        The history is provided by the patient.     Pertinent findings: no hypokalemia on CMP.     Clinical diagnosis:  Muscle spasm (Primary)    Patient stable for DC with ED Prescriptions     Medication Sig Dispense Start Date End Date Auth. Provider    baclofen (LIORESAL) 20 MG tablet Take 1 tablet (20 mg total) by mouth 3   (three) times daily. for 7 days 21 tablet 1/8/2025 1/15/2025 Cynthia Anderson PA         Referrals: f/u with PCP    Strict follow-up precautions given. Patient verbalizes understanding of treatment plan and ED return precautions.       Amount and/or Complexity of Data Reviewed  Labs: ordered. Decision-making details documented in ED Course.    Risk  Prescription drug management.  Risk Details: " Given strict ED return precautions. I have spoken with the patient and/or caregivers. I have explained the patient's condition, diagnoses and treatment plan based on the information available to me at this time. I have answered the patient's and/or caregiver's questions and addressed any concerns. The patient and/or caregivers have as good an understanding of the patient's diagnosis, condition and treatment plan as can be expected at this point. The vital signs have been stable. The patient's condition is stable and appropriate for discharge from the emergency department.      The patient will pursue further outpatient evaluation with the primary care physician or other designated or consulting physician as outlined in the discharge instructions. The patient and/or caregivers are agreeable to this plan of care and follow-up instructions have been explained in detail. The patient and/or caregivers have received these instructions in written format and have expressed an understanding of the discharge instructions. The patient and/or caregivers are aware that any significant change in condition or worsening of symptoms should prompt an immediate return to this or the closest emergency department or a call to 911               Additional MDM:   Differential Diagnosis:   Other: The following diagnoses were also considered and will be evaluated: hypokalemia, muscle spasms and electrolyte disturbance.            ED Course as of 01/08/25 2131 Wed Jan 08, 2025 2130 Potassium: 3.6 [AG]   2130 Sodium: 142 [AG]      ED Course User Index  [AG] Cynthia Anderson PA                           Clinical Impression:  Final diagnoses:  [M62.838] Muscle spasm (Primary)          ED Disposition Condition    Discharge Stable          ED Prescriptions       Medication Sig Dispense Start Date End Date Auth. Provider    baclofen (LIORESAL) 20 MG tablet Take 1 tablet (20 mg total) by mouth 3 (three) times daily. for 7 days 21 tablet  1/8/2025 1/15/2025 Cynthia Anderson PA          Follow-up Information       Follow up With Specialties Details Why Contact Info    Rissa Leal, KERA Internal Medicine In 3 days  [address] [facility] [address]  [phone]      [facility] Dept Emergency Medicine Go to  If symptoms worsen, As needed [address] [facility] [address] [phone]             Cynthia Anderson PA  01/08/25 2134

## 2025-01-26 ENCOUNTER — HOSPITAL ENCOUNTER (EMERGENCY)
Facility: HOSPITAL | Age: 50
Discharge: HOME | End: 2025-01-26
Attending: EMERGENCY MEDICINE
Payer: MEDICAID

## 2025-01-26 VITALS
RESPIRATION RATE: 18 BRPM | HEIGHT: 65 IN | TEMPERATURE: 98 F | HEART RATE: 75 BPM | WEIGHT: 175.94 LBS | OXYGEN SATURATION: 95 % | BODY MASS INDEX: 29.31 KG/M2 | DIASTOLIC BLOOD PRESSURE: 81 MMHG | SYSTOLIC BLOOD PRESSURE: 123 MMHG
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
## 2025-01-26 DIAGNOSIS — S61.451A DOG BITE OF RIGHT HAND, INITIAL ENCOUNTER: Primary | ICD-10-CM

## 2025-01-26 DIAGNOSIS — S60.111A SUBUNGUAL HEMATOMA OF RIGHT THUMB, INITIAL ENCOUNTER: ICD-10-CM

## 2025-01-26 PROCEDURE — 63600175 PHARM REV CODE 636 W HCPCS: Mod: JZ,TB | Performed by: NURSE PRACTITIONER

## 2025-01-26 PROCEDURE — 90471 IMMUNIZATION ADMIN: CPT | Performed by: NURSE PRACTITIONER

## 2025-01-26 PROCEDURE — 90472 IMMUNIZATION ADMIN EACH ADD: CPT | Performed by: NURSE PRACTITIONER

## 2025-01-26 PROCEDURE — 90715 TDAP VACCINE 7 YRS/> IM: CPT | Performed by: NURSE PRACTITIONER

## 2025-01-26 PROCEDURE — 90375 RABIES IG IM/SC: CPT | Mod: JZ,TB | Performed by: NURSE PRACTITIONER

## 2025-01-26 PROCEDURE — 90675 RABIES VACCINE IM: CPT | Mod: JZ,TB | Performed by: NURSE PRACTITIONER

## 2025-01-26 PROCEDURE — 99284 EMERGENCY DEPT VISIT MOD MDM: CPT | Mod: 25

## 2025-01-26 PROCEDURE — 11740 EVACUATION SUBUNGUAL HMTMA: CPT | Mod: F5

## 2025-01-26 RX ORDER — AMOXICILLIN AND CLAVULANATE POTASSIUM 875; 125 MG/1; MG/1
1 TABLET, FILM COATED ORAL 2 TIMES DAILY
Qty: 14 TABLET | Refills: 0 | Status: SHIPPED | OUTPATIENT
Start: 2025-01-26

## 2025-01-26 RX ORDER — DICLOFENAC SODIUM 75 MG/1
75 TABLET, DELAYED RELEASE ORAL 2 TIMES DAILY
Qty: 14 TABLET | Refills: 0 | Status: SHIPPED | OUTPATIENT
Start: 2025-01-26 | End: 2025-02-02

## 2025-01-26 RX ADMIN — TETANUS TOXOID, REDUCED DIPHTHERIA TOXOID AND ACELLULAR PERTUSSIS VACCINE, ADSORBED 0.5 ML: 5; 2.5; 8; 8; 2.5 SUSPENSION INTRAMUSCULAR at 08:01

## 2025-01-26 RX ADMIN — RABIES IMMUNE GLOBULIN (HUMAN) 1590 UNITS: 300 INJECTION, SOLUTION INFILTRATION; INTRAMUSCULAR at 09:01

## 2025-01-26 RX ADMIN — RABIES VACCINE 2.5 UNITS: KIT at 09:01

## 2025-01-27 ENCOUNTER — PATIENT OUTREACH (OUTPATIENT)
Facility: OTHER | Age: 50
End: 2025-01-27
Payer: MEDICAID

## 2025-01-27 NOTE — DISCHARGE INSTRUCTIONS
Present to [facility] on Day 3 (1/29), Day 7 (2/2), and Day 14 (2/9) for next rabies vaccine doses.  Follow up with your primary care physician in 3-5 days for follow up evaluation.  Take medication as prescribed.  Return to the [facility] ED in 3 days for worsening redness, tenderness, or drainage from area.

## 2025-01-27 NOTE — ED PROVIDER NOTES
"Encounter Date: 2025       History     Chief Complaint   Patient presents with    Animal Bite     Pt states was walking his dog 2 days ago, and random dog went after his dog. Pt tried breaking up dogs and his R thumb got caught in random dog's mouth. R thumb is swollen, tender, and nail is discolored. States UTD on tetanus.      Patient is a 49-year-old male who presents for evaluation after having his thumb bitten by a dog.  Reports injury occurred 2 days ago when he was attempting to keep his own dog and a stray from fighting.  Patient reports a small puncture wound/abrasion to affected digit.  He also reports "darkening" to nail of affected finger.  Patient uncertain of last tetanus shot.  Patient denies animal acting aggressive prior to incident.  States, "I was just walking my dog and it appeared." Patient denies seeing animal in his neighborhood since incident occurred.      Review of patient's allergies indicates:  No Known Allergies  Past Medical History:   Diagnosis Date    Mixed hyperlipidemia     S/P left inguinal hernia repair 2024     Past Surgical History:   Procedure Laterality Date    APPENDECTOMY      COLONOSCOPY N/A 3/22/2024    Procedure: COLONOSCOPY;  Surgeon: Candido Zimmer MD;  Location: [facility] ENDOSCOPY;  Service: General;  Laterality: N/A;    REPAIR, HERNIA, INGUINAL Left 1/3/2024    Procedure: REPAIR, HERNIA, INGUINAL;  Surgeon: Fredi Degroot Jr., MD;  Location: [facility] OR;  Service: General;  Laterality: Left;     Family History   Problem Relation Name Age of Onset    Cancer Mother      Cancer Maternal Grandmother       Social History     Tobacco Use    Smoking status: Some Days     Current packs/day: 0.00     Average packs/day: 0.3 packs/day for 1.5 years (0.4 ttl pk-yrs)     Types: Cigarettes     Start date: 2019     Last attempt to quit:      Years since quittin.0    Smokeless tobacco: Never   Substance Use Topics    Alcohol use: Yes     Alcohol/week: " 2.0 standard drinks of alcohol     Types: 2 Cans of beer per week     Comment: only for football    Drug use: Never     Review of Systems   Constitutional:  Negative for chills, diaphoresis, fatigue and fever.   HENT:  Negative for facial swelling, postnasal drip, rhinorrhea, sinus pressure, sinus pain, sore throat and trouble swallowing.    Respiratory:  Negative for cough, chest tightness, shortness of breath and wheezing.    Cardiovascular:  Negative for chest pain, palpitations and leg swelling.   Gastrointestinal:  Negative for abdominal pain, diarrhea, nausea and vomiting.   Genitourinary:  Negative for dysuria, flank pain, hematuria and urgency.   Musculoskeletal:  Negative for arthralgias, back pain and myalgias.   Skin:  Positive for wound. Negative for color change and rash.   Neurological:  Negative for dizziness, syncope, weakness and headaches.   Hematological:  Does not bruise/bleed easily.   All other systems reviewed and are negative.      Physical Exam     Initial Vitals [01/26/25 1953]   BP Pulse Resp Temp SpO2   123/81 75 18 97.9 °F (36.6 °C) 95 %      MAP       --         Physical Exam    Nursing note and vitals reviewed.  Constitutional: Vital signs are normal. He appears well-developed and well-nourished.   HENT:   Head: Normocephalic.   Nose: Nose normal. Mouth/Throat: Oropharynx is clear and moist.   Eyes: Conjunctivae and EOM are normal. Pupils are equal, round, and reactive to light.   Neck: Neck supple.   Normal range of motion.  Cardiovascular:  Normal rate, regular rhythm, normal heart sounds and intact distal pulses.           Pulmonary/Chest: Effort normal and breath sounds normal. No respiratory distress. He has no wheezes. He has no rhonchi. He has no rales. He exhibits no tenderness.   Abdominal: Abdomen is soft and flat. Bowel sounds are normal. There is no abdominal tenderness. There is no rebound, no guarding, no tenderness at McBurney's point and negative Smith's sign.    Musculoskeletal:         General: Normal range of motion.      Right hand: Swelling and tenderness present. Normal range of motion. Normal strength. Normal sensation. There is no disruption of two-point discrimination. Normal pulse.        Hands:       Cervical back: Normal range of motion and neck supple.     Neurological: He is alert and oriented to person, place, and time. He has normal strength.   Skin: Skin is warm and dry. Capillary refill takes less than 2 seconds.   Psychiatric: He has a normal mood and affect. His behavior is normal. Judgment and thought content normal.         ED Course   I & D - Incision and Drainage    Date/Time: 1/26/2025 8:41 PM  Location procedure was performed: [facility] EMERGENCY DEPARTMENT    Performed by: Albert Lopez Jr., FNP  Authorized by: Albert Lopez Jr., FNP  Consent Done: Emergent Situation  Type: subungual hematoma  Body area: upper extremity  Location details: right thumb  Anesthesia method: None.    Patient sedated: no  Complications: No  Specimens: No  Implants: No  Comments: Small gricelda hole created with cautery tool. Scant brownish drainage noted. Affected digit remains neurovascularly intact post procedure.        Labs Reviewed - No data to display       Imaging Results              X-Ray Hand 3 View Right (Final result)  Result time 01/26/25 20:20:15      Final result by Varun David MD (01/26/25 20:20:15)                   Impression:      Limited study, no acute abnormalities are demonstrated.      Electronically signed by: Varun David MD  Date:    01/26/2025  Time:    20:20               Narrative:    EXAMINATION:  XR HAND COMPLETE 3 VIEW RIGHT    CLINICAL HISTORY:  dog dite;    TECHNIQUE:  PA, lateral, and oblique views of the right hand were performed.    COMPARISON:  None    FINDINGS:  There are no fractures seen.  There is no dislocation.  Fingers are not well position on the lateral view.                                       Medications   rabies  vaccine, PCEC injection 2.5 Units (has no administration in time range)   rabies immune globulin (PF) (HYPERRAB) injection 1,590 Units (has no administration in time range)   Tdap (BOOSTRIX) vaccine injection 0.5 mL (0.5 mLs Intramuscular Given 1/26/25 2020)     Medical Decision Making  Differential:   Dog bite   Fracture   Subungual hematoma    Amount and/or Complexity of Data Reviewed  Radiology: ordered.    Risk  Prescription drug management.                                      Clinical Impression:  Final diagnoses:  [S61.451A, W54.0XXA] Dog bite of right hand, initial encounter (Primary)  [S60.111A] Subungual hematoma of right thumb, initial encounter          ED Disposition Condition    Discharge Stable          ED Prescriptions       Medication Sig Dispense Start Date End Date Auth. Provider    amoxicillin-clavulanate 875-125mg (AUGMENTIN) 875-125 mg per tablet Take 1 tablet by mouth 2 (two) times daily. 14 tablet 1/26/2025 -- Albert Lopez Jr., FNP    diclofenac (VOLTAREN) 75 MG EC tablet Take 1 tablet (75 mg total) by mouth 2 (two) times daily. for 7 days 14 tablet 1/26/2025 2/2/2025 Albert Lopez Jr., FNP          Follow-up Information       Follow up With Specialties Details Why Contact Rissa Ahmadi FNP Internal Medicine In 3 days  [address] [facility] [address] [phone]      [facility] Emergency Dept Emergency Medicine In 3 days As needed, If symptoms worsen [address] [facility] [address] [phone]             Albert Lopez Jr., FNP  01/26/25 2047

## 2025-01-28 NOTE — PROGRESS NOTES
Ernestina Gutierrez MA  ED Navigator  Emergency Department    [place] [facility] [place]  Role: Community Health Worker    Date: 2025  Patient Name: Kobe Sidhu  MRN: [id number]  PCP: Rissa Leal FNP    Assessment:     Kobe Sidhu is a 49 y.o. male who has presented to ED for dog bite. Patient has visited the ED 5 times in the past 3 months. Patient did not contact PCP.     ED Navigator Initial Assessment    ED Navigator Enrollment Documentation  Consent to Services  Does patient consent to completing the assessment?: Yes  Contact  Method of Initial Contact: Phone  Transportation  Insurance Coverage  Do you have coverage/adequate coverage?: Yes  Specialist Appointment  Did the patient come to the ED to see a specialist?: No  Does the patient have a pending specialist referral?: No  Does the patient have a specialist appointment made?: No  PCP Follow Up Appointment  Medications  Is patient able to afford medication?: Yes  Psychological  Food  Communication/Education  Other Financial Concerns  Other Social Barriers/Concerns  Primary Barrier  Plan: Provided information for [facility]  Nurse triage line, [phone] or [phone] ([phone])         Social History     Socioeconomic History    Marital status: Single   Occupational History    Occupation: cuts grass   Tobacco Use    Smoking status: Some Days     Current packs/day: 0.00     Average packs/day: 0.3 packs/day for 1.5 years (0.4 ttl pk-yrs)     Types: Cigarettes     Start date: 2019     Last attempt to quit:      Years since quittin.0    Smokeless tobacco: Never   Substance and Sexual Activity    Alcohol use: Yes     Alcohol/week: 2.0 standard drinks of alcohol     Types: 2 Cans of beer per week     Comment: only for football    Drug use: Never    Sexual activity: Yes     Partners: Female     Social Drivers of Health     Financial Resource Strain: Low Risk  (2023)    Overall Financial Resource Strain (CARDIA)      Difficulty of Paying Living Expenses: Not hard at all   Food Insecurity: No Food Insecurity (1/28/2025)    Hunger Vital Sign     Worried About Running Out of Food in the Last Year: Never true     Ran Out of Food in the Last Year: Never true   Transportation Needs: No Transportation Needs (1/28/2025)    TRANSPORTATION NEEDS     Transportation : No   Physical Activity: Insufficiently Active (1/28/2025)    Exercise Vital Sign     Days of Exercise per Week: 3 days     Minutes of Exercise per Session: 30 min   Stress: No Stress Concern Present (1/24/2023)    [employer] - Occupational Stress Questionnaire     Feeling of Stress : Not at all   Housing Stability: Low Risk  (1/28/2025)    Housing Stability Vital Sign     Unable to Pay for Housing in the Last Year: No     Homeless in the Last Year: No       Plan:   Appointment made with: Rissa Leal FNP for 2/4/25 at 10:20 am, patient will receive an appointment reminder. Patient denies any new needs or needing any additional assistance at this time.    ED Navigator gave [facility] 24/7 Nurse triage line ([phone] or [phone] ([phone]) contact information, in addition to office contact information if further assistance is needed in the future.   ED navigator will follow-up with patient on/around 2/3/25.

## 2025-01-30 ENCOUNTER — LAB VISIT (OUTPATIENT)
Dept: LAB | Facility: HOSPITAL | Age: 50
End: 2025-01-30
Attending: UROLOGY
Payer: MEDICAID

## 2025-01-30 ENCOUNTER — OFFICE VISIT (OUTPATIENT)
Dept: UROLOGY | Facility: CLINIC | Age: 50
End: 2025-01-30
Payer: MEDICAID

## 2025-01-30 VITALS
SYSTOLIC BLOOD PRESSURE: 115 MMHG | TEMPERATURE: 98 F | HEIGHT: 65 IN | OXYGEN SATURATION: 100 % | RESPIRATION RATE: 20 BRPM | BODY MASS INDEX: 29.02 KG/M2 | WEIGHT: 174.19 LBS | DIASTOLIC BLOOD PRESSURE: 71 MMHG | HEART RATE: 54 BPM

## 2025-01-30 DIAGNOSIS — Q55.4: Primary | ICD-10-CM

## 2025-01-30 DIAGNOSIS — Q55.4: ICD-10-CM

## 2025-01-30 LAB
BILIRUB SERPL-MCNC: NORMAL MG/DL
BLOOD URINE, POC: NORMAL
COLOR, POC UA: YELLOW
GLUCOSE UR QL STRIP: NORMAL
KETONES UR QL STRIP: NORMAL
LEUKOCYTE ESTERASE URINE, POC: NORMAL
NITRITE, POC UA: NORMAL
PH, POC UA: 5.5
PROTEIN, POC: NORMAL
PSA SERPL-MCNC: 0.85 NG/ML
SPECIFIC GRAVITY, POC UA: >=1.03
UROBILINOGEN, POC UA: 0.2

## 2025-01-30 PROCEDURE — 1160F RVW MEDS BY RX/DR IN RCRD: CPT | Mod: CPTII,,, | Performed by: UROLOGY

## 2025-01-30 PROCEDURE — 36415 COLL VENOUS BLD VENIPUNCTURE: CPT

## 2025-01-30 PROCEDURE — 99214 OFFICE O/P EST MOD 30 MIN: CPT | Mod: S$PBB,,, | Performed by: UROLOGY

## 2025-01-30 PROCEDURE — 99215 OFFICE O/P EST HI 40 MIN: CPT | Mod: PBBFAC | Performed by: UROLOGY

## 2025-01-30 PROCEDURE — 84153 ASSAY OF PSA TOTAL: CPT

## 2025-01-30 PROCEDURE — 3074F SYST BP LT 130 MM HG: CPT | Mod: CPTII,,, | Performed by: UROLOGY

## 2025-01-30 PROCEDURE — 1159F MED LIST DOCD IN RCRD: CPT | Mod: CPTII,,, | Performed by: UROLOGY

## 2025-01-30 PROCEDURE — 81001 URINALYSIS AUTO W/SCOPE: CPT | Mod: PBBFAC | Performed by: UROLOGY

## 2025-01-30 PROCEDURE — 3008F BODY MASS INDEX DOCD: CPT | Mod: CPTII,,, | Performed by: UROLOGY

## 2025-01-30 PROCEDURE — 3078F DIAST BP <80 MM HG: CPT | Mod: CPTII,,, | Performed by: UROLOGY

## 2025-01-30 RX ORDER — BACLOFEN 20 MG/1
20 TABLET ORAL 3 TIMES DAILY
COMMUNITY

## 2025-01-30 NOTE — PROGRESS NOTES
CC:  LLQ pain    HPI:  Kobe Sidhu is a 49 y.o. male seen for follow-up of left lower quadrant pain.  He was having abdominal pain and went to the emergency room.  He was diagnosed with a left hernia and after reducing the hernia was sent out and a referral was generated to General surgery. He had that repaired in January 2024.   He had a  CT scan which showed a prostatic cyst.  MRI in December 2023 showed a 32 mm x 30 mm x 27 mm midline cystic mass extends from the posterior prostatic base between the seminal vesicles and is most consistent with a mullerian duct cyst or large prostatic utricle.  He began with left lower quadrant pain again a couple of months ago.  This is intermittent.  No pain consistent with the urinary tract.        Urinalysis:  Results for orders placed or performed in visit on 01/30/25   POCT URINE DIPSTICK WITH MICROSCOPE, AUTOMATED   Result Value Ref Range    Color, UA Yellow     Spec Grav UA >=1.030     pH, UA 5.5     WBC, UA neg     Nitrite, UA neg     Protein, POC neg     Glucose, UA neg     Ketones, UA trace     Urobilinogen, UA 0.2     Bilirubin, POC neg     Blood, UA neg      Microscopic Urinalysis:  WBC:   None per HPF     RBC:    None per HPF     Bacteria:    None per HPF     Squamous epithelial cells:  None per HPF      Crystals:   None    Lab Results:  PSA History:     Latest Reference Range & Units 04/27/23 08:17   Prostate Specific Antigen <=4.00 ng/mL 0.78     Recent Labs     01/08/25 2033   CREATININE 0.94       Imaging:  CT - 26 November 2024:  Unchanged prostate cyst    Data Review:  CT.  Creatinine.  ER note from Irvin Farrar MD dated 25 November 2024.    ROS:  All systems reviewed and are negative except as documented in HPI and/or Assessment and Plan.     Patient Active Problem List:     Patient Active Problem List   Diagnosis    Rotator cuff injury, right, sequela    Mixed hyperlipidemia    Prediabetes    Syphilis    Left inguinal hernia        Past Medical  History:  Past Medical History:   Diagnosis Date    Mixed hyperlipidemia     S/P left inguinal hernia repair 2024        Past Surgical History:  Past Surgical History:   Procedure Laterality Date    APPENDECTOMY  1984    COLONOSCOPY N/A 3/22/2024    Procedure: COLONOSCOPY;  Surgeon: Candido Zimmer MD;  Location: [facility] ENDOSCOPY;  Service: General;  Laterality: N/A;    REPAIR, HERNIA, INGUINAL Left 1/3/2024    Procedure: REPAIR, HERNIA, INGUINAL;  Surgeon: Fredi Degroot Jr., MD;  Location: [facility] OR;  Service: General;  Laterality: Left;        Family History:  Family History   Problem Relation Name Age of Onset    Cancer Mother      Cancer Maternal Grandmother          Social History:  Social History     Socioeconomic History    Marital status: Single   Occupational History    Occupation: cuts grass   Tobacco Use    Smoking status: Some Days     Current packs/day: 0.00     Average packs/day: 0.3 packs/day for 1.5 years (0.4 ttl pk-yrs)     Types: Cigarettes     Start date: 2019     Last attempt to quit:      Years since quittin.0    Smokeless tobacco: Never   Substance and Sexual Activity    Alcohol use: Yes     Alcohol/week: 2.0 standard drinks of alcohol     Types: 2 Cans of beer per week     Comment: only for football    Drug use: Never    Sexual activity: Yes     Partners: Female     Social Drivers of Health     Financial Resource Strain: Low Risk  (2023)    Overall Financial Resource Strain (CARDIA)     Difficulty of Paying Living Expenses: Not hard at all   Food Insecurity: No Food Insecurity (2025)    Hunger Vital Sign     Worried About Running Out of Food in the Last Year: Never true     Ran Out of Food in the Last Year: Never true   Transportation Needs: No Transportation Needs (2025)    TRANSPORTATION NEEDS     Transportation : No   Physical Activity: Insufficiently Active (2025)    Exercise Vital Sign     Days of Exercise per Week: 3 days     Minutes of  Exercise per Session: 30 min   Stress: No Stress Concern Present (1/24/2023)    [employer] - Occupational Stress Questionnaire     Feeling of Stress : Not at all   Housing Stability: Low Risk  (1/28/2025)    Housing Stability Vital Sign     Unable to Pay for Housing in the Last Year: No     Homeless in the Last Year: No        Allergies:  Review of patient's allergies indicates:  No Known Allergies     Objective:  Vitals:    01/30/25 1101   BP: 115/71   Pulse: (!) 54   Resp: 20   Temp: 97.9 °F (36.6 °C)     General:  Well developed, well nourished adult male in no acute distress  Abdomen: Soft, nontender, no masses  Extremities:  No clubbing, cyanosis, or edema  Neurologic:  Grossly intact  Musculoskeletal:  Normal tone    Assessment:  1. Mullerian duct cyst of prostatic utricle  - Ambulatory referral/consult to Urology  - POCT URINE DIPSTICK WITH MICROSCOPE, AUTOMATED  - baclofen (LIORESAL) 20 MG tablet; Take 20 mg by mouth 3 (three) times daily.  - PSA, Total (Diagnostic); Future  - CT Abdomen Pelvis W Wo Contrast; Future  - PSA, Total (Diagnostic); Future    Plan:  The prostatic cyst is stable on the recent CT scan.  This is definitely not causing his pain.  The location of his pain is not consistent with the urinary tract.  I have recommended that he see General surgery since this is the location of his hernia repair one year ago.  Otherwise if that is not the cause of his pain I recommended that he see his primary care.  We will repeat the CT scan and PSA in one year.  He is going to get a PSA today.    Follow-up tests needed:   PSA and CT scan in one year.      Return appointment:  One year after above studies.

## 2025-02-12 ENCOUNTER — PATIENT OUTREACH (OUTPATIENT)
Facility: OTHER | Age: 50
End: 2025-02-12
Payer: MEDICAID

## 2025-02-12 NOTE — PROGRESS NOTES
ED Navigator attempted to contact patient for follow up, patient has no voicemail or MyChart and  is unable to reach. ED Navigator to close encounter at this time.

## 2025-02-14 ENCOUNTER — HOSPITAL ENCOUNTER (OUTPATIENT)
Dept: RADIOLOGY | Facility: HOSPITAL | Age: 50
Discharge: HOME | End: 2025-02-14
Attending: UROLOGY
Payer: MEDICAID

## 2025-02-14 DIAGNOSIS — Q55.4: ICD-10-CM

## 2025-02-14 PROCEDURE — 74178 CT ABD&PLV WO CNTR FLWD CNTR: CPT | Mod: TC

## 2025-02-14 PROCEDURE — 25500020 PHARM REV CODE 255

## 2025-02-14 RX ADMIN — IOHEXOL 100 ML: 350 INJECTION, SOLUTION INTRAVENOUS at 12:02

## 2025-02-18 ENCOUNTER — OFFICE VISIT (OUTPATIENT)
Dept: SURGERY | Facility: CLINIC | Age: 50
End: 2025-02-18
Payer: MEDICAID

## 2025-02-18 VITALS
SYSTOLIC BLOOD PRESSURE: 117 MMHG | WEIGHT: 171.81 LBS | RESPIRATION RATE: 20 BRPM | TEMPERATURE: 98 F | OXYGEN SATURATION: 99 % | DIASTOLIC BLOOD PRESSURE: 81 MMHG | HEIGHT: 64 IN | HEART RATE: 59 BPM | BODY MASS INDEX: 29.33 KG/M2

## 2025-02-18 DIAGNOSIS — Z98.890 S/P INGUINAL HERNIA REPAIR: Primary | ICD-10-CM

## 2025-02-18 DIAGNOSIS — Z87.19 S/P INGUINAL HERNIA REPAIR: Primary | ICD-10-CM

## 2025-02-18 PROCEDURE — 99214 OFFICE O/P EST MOD 30 MIN: CPT | Mod: PBBFAC

## 2025-02-18 NOTE — PROGRESS NOTES
[facility] General Surgery Clinic Note    HPI: Mr. Kobe Sidhu is a 49 y.o. M who presents s/p inguinal hernia repair on 1/3/2025. He states he is concerned he may have re injured himself after lifting a pallet of raw chicken (~40 lbs). He states that he has had dull LLQ pain that waxes and wanes with exertion since. He denies fever, chills, nausea, vomiting, chest pain, SOB, dysuria. He is a being followed by urology for a prostatic cyst identified on CT 1/25/2025.     PMH:   Past Medical History:   Diagnosis Date    Mixed hyperlipidemia     S/P left inguinal hernia repair 01/03/2024      Meds: Current Medications[1]  Allergies: Review of patient's allergies indicates:  No Known Allergies  Social History: Social History[2]  Family History:   Family History   Problem Relation Name Age of Onset    Cancer Mother      Cancer Maternal Grandmother       Surgical History:   Past Surgical History:   Procedure Laterality Date    APPENDECTOMY  1984    COLONOSCOPY N/A 3/22/2024    Procedure: COLONOSCOPY;  Surgeon: Candido Zimmer MD;  Location: [facility] ENDOSCOPY;  Service: General;  Laterality: N/A;    REPAIR, HERNIA, INGUINAL Left 1/3/2024    Procedure: REPAIR, HERNIA, INGUINAL;  Surgeon: Fredi Degroot Jr., MD;  Location: [facility] OR;  Service: General;  Laterality: Left;     Review of Systems:  Skin: No rashes or itching.  Head: Denies headache or recent trauma.  Eyes: Denies eye pain or double vision.  Neck: Denies swelling or hoarseness of voice.  Respiratory: Denies shortness of breath or chest pain  Cardiac: Denies palpitations or swelling in hands/feet.  Gastrointestinal: Denies nausea, denies vomiting. ***  Urinary: Denies dysuria or hematuria.  Vascular: Denies claudication or leg swelling.  Neuro: Denies motor deficits. Denies weakness.  Endocrine: Denies excessive sweating or cold intolerance.  Psych: Denies memory problems. Denies anxiety.    Objective:    Vitals:  Vitals:    02/18/25 0930   BP: 117/81   Pulse:  (!) 59   Resp: 20   Temp: 97.9 °F (36.6 °C)        Physical Exam:  Gen: NAD  Neuro: awake, alert, answering questions appropriately  CV: RRR  Resp: non-labored breathing, EUGENE  Abd: soft, ND, NT  : Abdomen is flat, non distended. No mass present. Prior surgical incisions present in LLQ and RLQ that are healed.   Ext: moves all 4 spontaneously and purposefully  Skin: warm, well perfused    Pertinent Labs:  None    Imaging:  CT Abdomen (1/28/2025)  Impression:  Cystic lesion prostate appears stable since prior examination there is enlargement of some vesicles bilaterally which there is also similar to the prior examination.  The prostate is 3D enhanced due to the early arterial phase of this examination.       Micro/Path/Other:  None    Assessment/Plan:  Mr. Kobe Sidhu is a 49 y.o. M who presents s/p inguinal hernia repair on 1/3/2025, with concerned for possible re injury. He reports dull LLQ pain that waxes and wanes with exertion. He denies fever, chills, nausea, vomiting, chest pain, SOB, dysuria. CT Abdomen performed on 1/28/2025 did not reveal evidence concerning for hernia. He is being followed by Urology. Given the lack of evidence for recurrent hernia, we will plan to discharge him from our clinic and have him follow up only as needed.     - Follow up with urology    Ihsan Barnes MS3  [facility]         [1]   Current Outpatient Medications:     aspirin-caffeine (DESIRAE BACK AND BODY) 500-32.5 mg Tab, Take by mouth., Disp: , Rfl:     baclofen (LIORESAL) 20 MG tablet, Take 20 mg by mouth 3 (three) times daily., Disp: , Rfl:     amoxicillin-clavulanate 875-125mg (AUGMENTIN) 875-125 mg per tablet, Take 1 tablet by mouth 2 (two) times daily. (Patient not taking: Reported on 2/18/2025), Disp: 14 tablet, Rfl: 0    atorvastatin (LIPITOR) 40 MG tablet, Take 1 tablet (40 mg total) by mouth every evening. For High Cholesterol (Patient not taking: Reported on 2/18/2025), Disp: 30  tablet, Rfl: 3    polyethylene glycol (MOVIPREP) 100-7.5-2.691 gram solution, Take as directed prior to colonoscopy (Patient not taking: Reported on 2025), Disp: 1 kit, Rfl: 0    salicylic acid 17 % Kit, Apply 1 kit topically daily as needed. (Patient not taking: Reported on 2025), Disp: 1 kit, Rfl: 0  No current facility-administered medications for this visit.    Facility-Administered Medications Ordered in Other Visits:     albuterol-ipratropium 2.5 mg-0.5 mg/3 mL nebulizer solution 3 mL, 3 mL, Nebulization, Once PRN, Kiara Jansen MD    diphenhydrAMINE injection 25 mg, 25 mg, Intravenous, Once PRN, Kiara Jansen MD    HYDROmorphone injection 0.2 mg, 0.2 mg, Intravenous, Q5 Min PRN, Kiara Jansen MD    HYDROmorphone injection 0.5 mg, 0.5 mg, Intravenous, Q5 Min PRN, Kiara Jansen MD, 0.5 mg at 24 1218    lactated ringers infusion, , Intravenous, Continuous, Kiara Jansen MD, Last Rate: 10 mL/hr at 24 0921, New Bag at 24 0921    ondansetron injection 4 mg, 4 mg, Intravenous, Once, Kiara Jansen MD    prochlorperazine injection Soln 5 mg, 5 mg, Intravenous, Once PRN, Kiara Jansen MD  [2]   Social History  Tobacco Use    Smoking status: Some Days     Current packs/day: 0.00     Average packs/day: 0.3 packs/day for 1.5 years (0.4 ttl pk-yrs)     Types: Cigarettes     Start date: 2019     Last attempt to quit:      Years since quittin.1    Smokeless tobacco: Never   Substance Use Topics    Alcohol use: Yes     Alcohol/week: 2.0 standard drinks of alcohol     Types: 2 Cans of beer per week     Comment: only for football    Drug use: Never

## 2025-02-18 NOTE — PROGRESS NOTES
[facility] General Surgery Clinic Note    HPI: Mr. Kobe Sidhu is a 49 y.o. M who presents s/p inguinal hernia repair on 1/3/2025. He states he is concerned he may have re injured himself after lifting a pallet of raw chicken (~40 lbs). He states that he has had dull LLQ pain that waxes and wanes with exertion since. He denies fever, chills, nausea, vomiting, chest pain, SOB, dysuria. He is a being followed by urology for a prostatic cyst identified on CT 1/25/2025.     PMH:   Past Medical History:   Diagnosis Date    Mixed hyperlipidemia     S/P left inguinal hernia repair 01/03/2024      Meds: Current Medications[1]  Allergies: Review of patient's allergies indicates:  No Known Allergies  Social History: Social History[2]  Family History:   Family History   Problem Relation Name Age of Onset    Cancer Mother      Cancer Maternal Grandmother       Surgical History:   Past Surgical History:   Procedure Laterality Date    APPENDECTOMY  1984    COLONOSCOPY N/A 3/22/2024    Procedure: COLONOSCOPY;  Surgeon: Candido Zimmer MD;  Location: [facility] ENDOSCOPY;  Service: General;  Laterality: N/A;    REPAIR, HERNIA, INGUINAL Left 1/3/2024    Procedure: REPAIR, HERNIA, INGUINAL;  Surgeon: Fredi Degroot Jr., MD;  Location: [facility] OR;  Service: General;  Laterality: Left;     Review of Systems:  Skin: No rashes or itching.  Head: Denies headache or recent trauma.  Eyes: Denies eye pain or double vision.  Neck: Denies swelling or hoarseness of voice.  Respiratory: Denies shortness of breath or chest pain  Cardiac: Denies palpitations or swelling in hands/feet.  Gastrointestinal: Denies nausea, denies vomiting. ***  Urinary: Denies dysuria or hematuria.  Vascular: Denies claudication or leg swelling.  Neuro: Denies motor deficits. Denies weakness.  Endocrine: Denies excessive sweating or cold intolerance.  Psych: Denies memory problems. Denies anxiety.    Objective:    Vitals:  Vitals:    02/18/25 0930   BP: 117/81   Pulse:  (!) 59   Resp: 20   Temp: 97.9 °F (36.6 °C)        Physical Exam:  Gen: NAD  Neuro: awake, alert, answering questions appropriately  CV: RRR  Resp: non-labored breathing, EUGENE  Abd: soft, ND, NT  : Abdomen is flat, non distended. No mass present. Prior surgical incisions present in LLQ and RLQ that are healed.   Ext: moves all 4 spontaneously and purposefully  Skin: warm, well perfused    Pertinent Labs:  None    Imaging:  CT Abdomen (1/28/2025)  Impression:  Cystic lesion prostate appears stable since prior examination there is enlargement of some vesicles bilaterally which there is also similar to the prior examination.  The prostate is 3D enhanced due to the early arterial phase of this examination.       Micro/Path/Other:  None    Assessment/Plan:  Mr. Kobe Sidhu is a 49 y.o. M who presents s/p inguinal hernia repair on 1/3/2025, with concerned for possible re injury. He reports dull LLQ pain that waxes and wanes with exertion. He denies fever, chills, nausea, vomiting, chest pain, SOB, dysuria. CT Abdomen performed on 1/28/2025 did not reveal evidence concerning for hernia. He is being followed by Urology. Given the lack of evidence for recurrent hernia, we will plan to discharge him from our clinic and have him follow up only as needed.     - Follow up with urology    Ihsan Barnes MS3  [facility]         [1]   Current Outpatient Medications:     aspirin-caffeine (DESIRAE BACK AND BODY) 500-32.5 mg Tab, Take by mouth., Disp: , Rfl:     baclofen (LIORESAL) 20 MG tablet, Take 20 mg by mouth 3 (three) times daily., Disp: , Rfl:     amoxicillin-clavulanate 875-125mg (AUGMENTIN) 875-125 mg per tablet, Take 1 tablet by mouth 2 (two) times daily. (Patient not taking: Reported on 2/18/2025), Disp: 14 tablet, Rfl: 0    atorvastatin (LIPITOR) 40 MG tablet, Take 1 tablet (40 mg total) by mouth every evening. For High Cholesterol (Patient not taking: Reported on 2/18/2025), Disp: 30  tablet, Rfl: 3    polyethylene glycol (MOVIPREP) 100-7.5-2.691 gram solution, Take as directed prior to colonoscopy (Patient not taking: Reported on 2025), Disp: 1 kit, Rfl: 0    salicylic acid 17 % Kit, Apply 1 kit topically daily as needed. (Patient not taking: Reported on 2025), Disp: 1 kit, Rfl: 0  No current facility-administered medications for this visit.    Facility-Administered Medications Ordered in Other Visits:     albuterol-ipratropium 2.5 mg-0.5 mg/3 mL nebulizer solution 3 mL, 3 mL, Nebulization, Once PRN, Kiara Jansen MD    diphenhydrAMINE injection 25 mg, 25 mg, Intravenous, Once PRN, Kiara Jansen MD    HYDROmorphone injection 0.2 mg, 0.2 mg, Intravenous, Q5 Min PRN, Kiara Jansen MD    HYDROmorphone injection 0.5 mg, 0.5 mg, Intravenous, Q5 Min PRN, Kiara Jansen MD, 0.5 mg at 24 1218    lactated ringers infusion, , Intravenous, Continuous, Kiara Jansen MD, Last Rate: 10 mL/hr at 24 0921, New Bag at 24 0921    ondansetron injection 4 mg, 4 mg, Intravenous, Once, Kiara Jansen MD    prochlorperazine injection Soln 5 mg, 5 mg, Intravenous, Once PRN, Kiara Jansen MD  [2]   Social History  Tobacco Use    Smoking status: Some Days     Current packs/day: 0.00     Average packs/day: 0.3 packs/day for 1.5 years (0.4 ttl pk-yrs)     Types: Cigarettes     Start date: 2019     Last attempt to quit:      Years since quittin.1    Smokeless tobacco: Never   Substance Use Topics    Alcohol use: Yes     Alcohol/week: 2.0 standard drinks of alcohol     Types: 2 Cans of beer per week     Comment: only for football    Drug use: Never

## 2025-02-18 NOTE — LETTER
February 18, 2025      [facility]  [address] [address]  Phone: [phone]       Patient: Kobe Sidhu   YOB: 1975  Date of Visit: 02/18/2025    To Whom It May Concern:    Royal Sidhu  was at [facility] on 02/18/2025. The patient may return to work/school on 2/19/25 with no restrictions. If you have any questions or concerns, or if I can be of further assistance, please do not hesitate to contact me.    Sincerely,    Melida Flynn LPN

## 2025-02-18 NOTE — PROGRESS NOTES
[facility] General Surgery Clinic Note    HPI: Mr. Kobe Sidhu is a 49 y.o. M who presents s/p inguinal hernia repair on 1/3/2024. He states he is concerned he may have re injured himself after lifting a pallet of raw chicken (~40 lbs). He states that he has had dull LLQ pain that waxes and wanes with exertion. He denies fever, chills, nausea, vomiting, chest pain, SOB, dysuria. He is a being followed by urology for a prostatic cyst identified on CT 1/25/2025. He denies groin bulging. Patient reports he is here to make sure her hernia has not recurred.     PMH:   Past Medical History:   Diagnosis Date    Mixed hyperlipidemia     S/P left inguinal hernia repair 01/03/2024      Meds: Current Medications[1]  Allergies: Review of patient's allergies indicates:  No Known Allergies  Social History: Social History[2]  Family History:   Family History   Problem Relation Name Age of Onset    Cancer Mother      Cancer Maternal Grandmother       Surgical History:   Past Surgical History:   Procedure Laterality Date    APPENDECTOMY  1984    COLONOSCOPY N/A 3/22/2024    Procedure: COLONOSCOPY;  Surgeon: Candido Zimmer MD;  Location: [facility] ENDOSCOPY;  Service: General;  Laterality: N/A;    REPAIR, HERNIA, INGUINAL Left 1/3/2024    Procedure: REPAIR, HERNIA, INGUINAL;  Surgeon: Fredi Degroot Jr., MD;  Location: [facility] OR;  Service: General;  Laterality: Left;     Review of Systems:  Skin: No rashes or itching.  Head: Denies headache or recent trauma.  Eyes: Denies eye pain or double vision.  Neck: Denies swelling or hoarseness of voice.  Respiratory: Denies shortness of breath or chest pain  Cardiac: Denies palpitations or swelling in hands/feet.  Gastrointestinal: Denies nausea, denies vomiting.   Urinary: Denies dysuria or hematuria.  Vascular: Denies claudication or leg swelling.  Neuro: Denies motor deficits. Denies weakness.  Endocrine: Denies excessive sweating or cold intolerance.  Psych: Denies memory problems.  Denies anxiety.    Objective:    Vitals:  Vitals:    02/18/25 0930   BP: 117/81   Pulse: (!) 59   Resp: 20   Temp: 97.9 °F (36.6 °C)        Physical Exam:  Gen: NAD  Neuro: awake, alert, answering questions appropriately  CV: RRR  Resp: non-labored breathing on room air  Abd: soft, ND, NT, left inguinal scar, clean and dry, no bulging or tender to palpation to left groin area, RLQ surgical scar well healed, no bulging right groin  Ext: moves all 4 spontaneously and purposefully  Skin: warm, well perfused    Pertinent Labs:  None    Imaging:  CT Abdomen (1/28/2025)  Impression:  Cystic lesion prostate appears stable since prior examination there is enlargement of some vesicles bilaterally which there is also similar to the prior examination.  The prostate is 3D enhanced due to the early arterial phase of this examination.       Micro/Path/Other:  None    Assessment/Plan:  Mr. Kobe Sidhu is a 49 y.o. M who presents s/p inguinal hernia repair on 1/3/2024, with concerned for possible re injury. He reports dull LLQ pain that waxes and wanes with exertion. He denies fever, chills, nausea, vomiting, chest pain, SOB, dysuria. CT Abdomen performed on 1/28/2025 did not reveal evidence concerning for hernia.     - No evidence of hernia on physical examination  - Patient's symptoms appear to be sporadic and not related to inguinal hernia repair  - Patient can resume his daily activities and continue lifting as needed  - Educated on smoking cessation  - Return to clinic as needed    Ihsan Barnes MS3  [facility]    I have seen and examined the patient with the medical student. I agree with the assessment and plan and have made the appropriate edits to the note above.     Jonn Bertrand MD  \[facility]\"" General Surgery PGY-1           [1]   Current Outpatient Medications:     aspirin-caffeine (DESIRAE BACK AND BODY) 500-32.5 mg Tab, Take by mouth., Disp: , Rfl:     baclofen (LIORESAL) 20 MG  tablet, Take 20 mg by mouth 3 (three) times daily., Disp: , Rfl:     amoxicillin-clavulanate 875-125mg (AUGMENTIN) 875-125 mg per tablet, Take 1 tablet by mouth 2 (two) times daily. (Patient not taking: Reported on 2025), Disp: 14 tablet, Rfl: 0    atorvastatin (LIPITOR) 40 MG tablet, Take 1 tablet (40 mg total) by mouth every evening. For High Cholesterol (Patient not taking: Reported on 2025), Disp: 30 tablet, Rfl: 3    polyethylene glycol (MOVIPREP) 100-7.5-2.691 gram solution, Take as directed prior to colonoscopy (Patient not taking: Reported on 2025), Disp: 1 kit, Rfl: 0    salicylic acid 17 % Kit, Apply 1 kit topically daily as needed. (Patient not taking: Reported on 2025), Disp: 1 kit, Rfl: 0  No current facility-administered medications for this visit.    Facility-Administered Medications Ordered in Other Visits:     albuterol-ipratropium 2.5 mg-0.5 mg/3 mL nebulizer solution 3 mL, 3 mL, Nebulization, Once PRN, Kiara Jansen MD    diphenhydrAMINE injection 25 mg, 25 mg, Intravenous, Once PRN, Kiara Jansen MD    HYDROmorphone injection 0.2 mg, 0.2 mg, Intravenous, Q5 Min PRN, Kiara Jansen MD    HYDROmorphone injection 0.5 mg, 0.5 mg, Intravenous, Q5 Min PRN, Kiara Jansen MD, 0.5 mg at 24 1218    lactated ringers infusion, , Intravenous, Continuous, Kiara Jansen MD, Last Rate: 10 mL/hr at 24 0921, New Bag at 24 0921    ondansetron injection 4 mg, 4 mg, Intravenous, Once, Kiara Jansen MD    prochlorperazine injection Soln 5 mg, 5 mg, Intravenous, Once PRN, Kiara Jansen MD  [2]   Social History  Tobacco Use    Smoking status: Some Days     Current packs/day: 0.00     Average packs/day: 0.3 packs/day for 1.5 years (0.4 ttl pk-yrs)     Types: Cigarettes     Start date: 2019     Last attempt to quit:      Years since quittin.1    Smokeless tobacco: Never   Substance Use Topics    Alcohol use: Yes     Alcohol/week: 2.0  standard drinks of alcohol     Types: 2 Cans of beer per week     Comment: only for football    Drug use: Never

## 2025-02-19 NOTE — PROGRESS NOTES
I have reviewed the notes, assessments, and/or procedures performed by Dr Bertrand, I concur with her/his documentation of Kobe Sidhu.  Date of Service: 2/18/2025    [facility]

## 2025-03-28 ENCOUNTER — HOSPITAL ENCOUNTER (EMERGENCY)
Facility: HOSPITAL | Age: 50
Discharge: HOME | End: 2025-03-29
Attending: INTERNAL MEDICINE
Payer: MEDICAID

## 2025-03-28 DIAGNOSIS — R07.89 ATYPICAL CHEST PAIN: ICD-10-CM

## 2025-03-28 DIAGNOSIS — R07.9 CHEST PAIN: ICD-10-CM

## 2025-03-28 DIAGNOSIS — R07.89 CHEST WALL PAIN: Primary | ICD-10-CM

## 2025-03-28 PROCEDURE — 83880 ASSAY OF NATRIURETIC PEPTIDE: CPT | Performed by: INTERNAL MEDICINE

## 2025-03-28 PROCEDURE — 80053 COMPREHEN METABOLIC PANEL: CPT | Performed by: INTERNAL MEDICINE

## 2025-03-28 PROCEDURE — 99285 EMERGENCY DEPT VISIT HI MDM: CPT | Mod: 25

## 2025-03-28 PROCEDURE — 84484 ASSAY OF TROPONIN QUANT: CPT | Performed by: INTERNAL MEDICINE

## 2025-03-28 PROCEDURE — 93005 ELECTROCARDIOGRAM TRACING: CPT

## 2025-03-28 PROCEDURE — 36415 COLL VENOUS BLD VENIPUNCTURE: CPT | Performed by: INTERNAL MEDICINE

## 2025-03-29 VITALS
WEIGHT: 169.31 LBS | HEART RATE: 77 BPM | BODY MASS INDEX: 28.21 KG/M2 | OXYGEN SATURATION: 98 % | TEMPERATURE: 98 F | DIASTOLIC BLOOD PRESSURE: 72 MMHG | RESPIRATION RATE: 12 BRPM | HEIGHT: 65 IN | SYSTOLIC BLOOD PRESSURE: 100 MMHG

## 2025-03-29 LAB
ALBUMIN SERPL-MCNC: 3.5 G/DL (ref 3.5–5)
ALBUMIN/GLOB SERPL: 0.9 RATIO (ref 1.1–2)
ALP SERPL-CCNC: 75 UNIT/L (ref 40–150)
ALT SERPL-CCNC: 17 UNIT/L (ref 0–55)
ANION GAP SERPL CALC-SCNC: 7 MEQ/L
AST SERPL-CCNC: 20 UNIT/L (ref 11–45)
BILIRUB SERPL-MCNC: 0.4 MG/DL
BNP BLD-MCNC: <10 PG/ML
BUN SERPL-MCNC: 17.5 MG/DL (ref 8.9–20.6)
CALCIUM SERPL-MCNC: 9.1 MG/DL (ref 8.4–10.2)
CHLORIDE SERPL-SCNC: 107 MMOL/L (ref 98–107)
CO2 SERPL-SCNC: 26 MMOL/L (ref 22–29)
CREAT SERPL-MCNC: 1.01 MG/DL (ref 0.72–1.25)
CREAT/UREA NIT SERPL: 17
GFR SERPLBLD CREATININE-BSD FMLA CKD-EPI: >60 ML/MIN/1.73/M2
GLOBULIN SER-MCNC: 3.8 GM/DL (ref 2.4–3.5)
GLUCOSE SERPL-MCNC: 100 MG/DL (ref 74–100)
HOLD SPECIMEN: NORMAL
POTASSIUM SERPL-SCNC: 3.7 MMOL/L (ref 3.5–5.1)
PROT SERPL-MCNC: 7.3 GM/DL (ref 6.4–8.3)
SODIUM SERPL-SCNC: 140 MMOL/L (ref 136–145)
TROPONIN I SERPL-MCNC: <0.01 NG/ML (ref 0–0.04)

## 2025-03-29 PROCEDURE — 96372 THER/PROPH/DIAG INJ SC/IM: CPT | Performed by: INTERNAL MEDICINE

## 2025-03-29 PROCEDURE — 63600175 PHARM REV CODE 636 W HCPCS: Mod: JZ,TB | Performed by: INTERNAL MEDICINE

## 2025-03-29 RX ORDER — KETOROLAC TROMETHAMINE 30 MG/ML
30 INJECTION, SOLUTION INTRAMUSCULAR; INTRAVENOUS
Status: COMPLETED | OUTPATIENT
Start: 2025-03-29 | End: 2025-03-29

## 2025-03-29 RX ORDER — DICLOFENAC SODIUM 50 MG/1
50 TABLET, DELAYED RELEASE ORAL 2 TIMES DAILY PRN
Qty: 20 TABLET | Refills: 0 | Status: SHIPPED | OUTPATIENT
Start: 2025-03-29

## 2025-03-29 RX ADMIN — KETOROLAC TROMETHAMINE 30 MG: 30 INJECTION, SOLUTION INTRAMUSCULAR at 12:03

## 2025-03-29 NOTE — ED PROVIDER NOTES
"Encounter Date: 3/28/2025       History     Chief Complaint   Patient presents with    Chest Pain     States intermittent chest pain starting just PTA.  States feels like "somebody hit you with a stick".       Presents with chest pain prior to arrival. States sharp, central chest pain, non radiating and constant. Denies SOB, nausea, vomiting, recent injury, rash, diaphoresis, fever, cough or hemoptysis. Denies chronic medical problems, not taking medications.     The history is provided by the patient.     Review of patient's allergies indicates:  No Known Allergies  Past Medical History:   Diagnosis Date    Mixed hyperlipidemia     S/P left inguinal hernia repair 01/03/2024     Past Surgical History:   Procedure Laterality Date    APPENDECTOMY  1984    COLONOSCOPY N/A 3/22/2024    Procedure: COLONOSCOPY;  Surgeon: Candido Zimmer MD;  Location: [facility] ENDOSCOPY;  Service: General;  Laterality: N/A;    REPAIR, HERNIA, INGUINAL Left 1/3/2024    Procedure: REPAIR, HERNIA, INGUINAL;  Surgeon: Fredi Degroot Jr., MD;  Location: [facility] OR;  Service: General;  Laterality: Left;     Family History   Problem Relation Name Age of Onset    Cancer Mother      Cancer Maternal Grandmother       Social History[1]  Review of Systems   Cardiovascular:  Positive for chest pain.       Physical Exam     Initial Vitals [03/28/25 2331]   BP Pulse Resp Temp SpO2   113/68 71 17 98.1 °F (36.7 °C) 97 %      MAP       --         Physical Exam    Nursing note and vitals reviewed.  Constitutional: He appears well-developed and well-nourished. No distress.   HENT:   Head: Normocephalic and atraumatic. Mouth/Throat: Oropharynx is clear and moist. No oropharyngeal exudate.   Eyes: Conjunctivae and EOM are normal. Pupils are equal, round, and reactive to light.   Neck: Neck supple. No tracheal deviation present. No JVD present.   Normal range of motion.  Cardiovascular:  Normal rate, regular rhythm, normal heart sounds and intact distal " pulses.           Pulmonary/Chest: Breath sounds normal. No stridor. No respiratory distress.   Abdominal: Abdomen is soft. Bowel sounds are normal. He exhibits no distension. There is no abdominal tenderness. There is no rebound and no guarding.   Musculoskeletal:         General: No edema. Normal range of motion.      Cervical back: Normal range of motion and neck supple.     Neurological: He is alert and oriented to person, place, and time. He has normal strength. GCS score is 15. GCS eye subscore is 4. GCS verbal subscore is 5. GCS motor subscore is 6.   Skin: Skin is warm and dry. No rash noted.   Psychiatric: His behavior is normal. Thought content normal.         ED Course   Procedures  Labs Reviewed   COMPREHENSIVE METABOLIC PANEL - Abnormal       Result Value    Sodium 140      Potassium 3.7      Chloride 107      CO2 26      Glucose 100      Blood Urea Nitrogen 17.5      Creatinine 1.01      Calcium 9.1      Protein Total 7.3      Albumin 3.5      Globulin 3.8 (*)     Albumin/Globulin Ratio 0.9 (*)     Bilirubin Total 0.4      ALP 75      ALT 17      AST 20      eGFR >60      Anion Gap 7.0      BUN/Creatinine Ratio 17     TROPONIN I - Normal    Troponin-I <0.010     B-TYPE NATRIURETIC PEPTIDE - Normal    Natriuretic Peptide <10.0     EXTRA TUBES    Narrative:     The following orders were created for panel order EXTRA TUBES.  Procedure                               Abnormality         Status                     ---------                               -----------         ------                     Light Blue Top Hold[[id number]]                             In process                 Lavender Top Hold[[id number]]                               In process                 Gold Top Hold[[id number]]                                   In process                   Please view results for these tests on the individual orders.   LIGHT BLUE TOP HOLD   LAVENDER TOP HOLD   GOLD TOP HOLD     EKG Readings: (Independently  Interpreted)   Initial Reading: No STEMI. Rhythm: Normal Sinus Rhythm. Heart Rate: 62. Ectopy: No Ectopy. Conduction: Normal. ST Segments: Normal ST Segments. T Waves: Normal. Axis: Right Axis Deviation. Clinical Impression: Normal Sinus Rhythm       Imaging Results              X-Ray Chest PA And Lateral (In process)                   X-Rays:   Independently Interpreted Readings:   Chest X-Ray: Normal heart size.  No infiltrates.  No acute abnormalities.     Medications   ketorolac injection 30 mg (30 mg Intramuscular Given 3/29/25 0014)     Medical Decision Making  Amount and/or Complexity of Data Reviewed  Labs: ordered. Decision-making details documented in ED Course.  Radiology: ordered and independent interpretation performed. Decision-making details documented in ED Course.  ECG/medicine tests: ordered and independent interpretation performed. Decision-making details documented in ED Course.    Risk  Prescription drug management.      Additional MDM:   Differential Diagnosis:   Miocardial infarction, pneumothorax, aortic dissection, pulmonary emboli, pneumonia, among others                                      Clinical Impression:  Final diagnoses:  [R07.9] Chest pain  [R07.89] Chest wall pain (Primary)  [R07.89] Atypical chest pain          ED Disposition Condition    Discharge Stable          ED Prescriptions       Medication Sig Dispense Start Date End Date Auth. Provider    diclofenac (VOLTAREN) 50 MG EC tablet Take 1 tablet (50 mg total) by mouth 2 (two) times daily as needed. 20 tablet 3/29/2025 -- Irvin Interiano MD          Follow-up Information       Follow up With Specialties Details Why Contact Rissa Ahmadi FNP Internal Medicine Schedule an appointment as soon as possible for a visit in 2 weeks  [address] [facility] [address]  [phone]      [facility] Emergency Medicine  If symptoms worsen [facility] [facility]  [address]  [phone]                 [1]   Social History  Tobacco Use    Smoking status: Some Days     Current packs/day: 0.00     Average packs/day: 0.3 packs/day for 1.5 years (0.4 ttl pk-yrs)     Types: Cigarettes     Start date: 2019     Last attempt to quit:      Years since quittin.2    Smokeless tobacco: Never   Substance Use Topics    Alcohol use: Yes     Alcohol/week: 2.0 standard drinks of alcohol     Types: 2 Cans of beer per week     Comment: only for football    Drug use: Never        Irvin Interiano MD  25 0045

## 2025-04-01 LAB
OHS QRS DURATION: 94 MS
OHS QTC CALCULATION: 410 MS

## 2025-07-11 ENCOUNTER — HOSPITAL ENCOUNTER (EMERGENCY)
Facility: HOSPITAL | Age: 50
Discharge: HOME | End: 2025-07-11
Attending: INTERNAL MEDICINE
Payer: MEDICAID

## 2025-07-11 VITALS
BODY MASS INDEX: 29.49 KG/M2 | DIASTOLIC BLOOD PRESSURE: 74 MMHG | RESPIRATION RATE: 20 BRPM | TEMPERATURE: 98 F | HEART RATE: 85 BPM | WEIGHT: 177 LBS | HEIGHT: 65 IN | OXYGEN SATURATION: 97 % | SYSTOLIC BLOOD PRESSURE: 116 MMHG

## 2025-07-11 DIAGNOSIS — L25.9 CONTACT DERMATITIS, UNSPECIFIED CONTACT DERMATITIS TYPE, UNSPECIFIED TRIGGER: Primary | ICD-10-CM

## 2025-07-11 DIAGNOSIS — R21 RASH: ICD-10-CM

## 2025-07-11 PROCEDURE — 99282 EMERGENCY DEPT VISIT SF MDM: CPT

## 2025-07-11 RX ORDER — CETIRIZINE HYDROCHLORIDE 10 MG/1
10 TABLET ORAL DAILY
Qty: 7 TABLET | Refills: 0 | Status: SHIPPED | OUTPATIENT
Start: 2025-07-11 | End: 2025-07-18

## 2025-07-12 DIAGNOSIS — F17.200 NEEDS SMOKING CESSATION EDUCATION: ICD-10-CM

## 2025-07-12 NOTE — ED PROVIDER NOTES
Encounter Date: 7/11/2025       History     Chief Complaint   Patient presents with    Rash     Pt c/o rash to arms and neck since Tuesday. Pt reports wife has this as well, pt uncertain if new detergents or soaps have been introduced to the household, pt suspects poison ivy as well. Vss.      49-year-old male who presents with complaints of rash over the trunk/upper arms that started roughly 3 days ago.  Reports that he has been using a new soap, and he believes this is the cause of his symptoms.  Denies any throat swelling, difficulty speaking, chest pain, shortness of breath, fever, body aches, chills, any other symptoms or complaints.  He has used topical steroid cream with some relief.    The history is provided by the patient.     Review of patient's allergies indicates:  No Known Allergies  Past Medical History:   Diagnosis Date    Mixed hyperlipidemia     S/P left inguinal hernia repair 01/03/2024     Past Surgical History:   Procedure Laterality Date    APPENDECTOMY  1984    COLONOSCOPY N/A 3/22/2024    Procedure: COLONOSCOPY;  Surgeon: Candido Zimmer MD;  Location: [facility] ENDOSCOPY;  Service: General;  Laterality: N/A;    REPAIR, HERNIA, INGUINAL Left 1/3/2024    Procedure: REPAIR, HERNIA, INGUINAL;  Surgeon: Fredi Degroot Jr., MD;  Location: [facility] OR;  Service: General;  Laterality: Left;     Family History   Problem Relation Name Age of Onset    Cancer Mother      Cancer Maternal Grandmother       Social History[1]  Review of Systems   Constitutional:  Negative for fever.   HENT:  Negative for sore throat.    Respiratory:  Negative for shortness of breath.    Cardiovascular:  Negative for chest pain.   Gastrointestinal:  Negative for nausea.   Genitourinary:  Negative for dysuria.   Musculoskeletal:  Negative for back pain.   Skin:  Positive for rash.   Neurological:  Negative for weakness.   Hematological:  Does not bruise/bleed easily.       Physical Exam     Initial Vitals [07/11/25 2209]    BP Pulse Resp Temp SpO2   116/74 85 20 97.7 °F (36.5 °C) 97 %      MAP       --         Physical Exam    Nursing note and vitals reviewed.  Constitutional: He appears well-developed and well-nourished. He is not diaphoretic. No distress.   HENT:   Head: Normocephalic and atraumatic.   Right Ear: Hearing and external ear normal.   Left Ear: Hearing and external ear normal.   Nose: Nose normal. Mouth/Throat: Uvula is midline and oropharynx is clear and moist. No trismus in the jaw. Normal dentition. No dental abscesses or uvula swelling. No oropharyngeal exudate, posterior oropharyngeal edema, posterior oropharyngeal erythema or tonsillar abscesses.   Eyes: Conjunctivae and EOM are normal. Pupils are equal, round, and reactive to light.   Neck: Neck supple.   Normal range of motion.  Cardiovascular:  Normal rate.           Pulmonary/Chest: Breath sounds normal. No respiratory distress. He has no wheezes.   Abdominal: Abdomen is soft. He exhibits no distension. There is no abdominal tenderness.   Musculoskeletal:         General: Normal range of motion.      Cervical back: Normal range of motion and neck supple.     Neurological: He is alert and oriented to person, place, and time. He has normal strength. No cranial nerve deficit or sensory deficit. GCS score is 15. GCS eye subscore is 4. GCS verbal subscore is 5. GCS motor subscore is 6.   Skin: Skin is warm. Capillary refill takes less than 2 seconds. Rash (Trunk/upper arms; no signs of infection) noted. Rash is not pustular and not vesicular. No erythema.   Psychiatric: He has a normal mood and affect. His behavior is normal. Thought content normal.         ED Course   Procedures  Labs Reviewed - No data to display       Imaging Results    None          Medications - No data to display  Medical Decision Making  Differential diagnosis:   Contact dermatitis   Irritant dermatitis   Eczema    Risk  OTC drugs.               ED Course as of 07/12/25 0216 Fri Jul 11,  2025 2230 Given strict ED return precautions. I have spoken with the patient and/or caregivers. I have explained the patient's condition, diagnoses and treatment plan based on the information available to me at this time. I have answered the patient's and/or caregiver's questions and addressed any concerns. The patient and/or caregivers have as good an understanding of the patient's diagnosis, condition and treatment plan as can be expected at this point. The vital signs have been stable. The patient's condition is stable and appropriate for discharge from the emergency department.      The patient will pursue further outpatient evaluation with the primary care physician or other designated or consulting physician as outlined in the discharge instructions. The patient and/or caregivers are agreeable to this plan of care and follow-up instructions have been explained in detail. The patient and/or caregivers have received these instructions in written format and have expressed an understanding of the discharge instructions. The patient and/or caregivers are aware that any significant change in condition or worsening of symptoms should prompt an immediate return to this or the closest emergency department or a call to 911.    [DB]      ED Course User Index  [DB] Nahun Zimmer PA-C                               Clinical Impression:  Final diagnoses:  [R21] Rash  [L25.9] Contact dermatitis, unspecified contact dermatitis type, unspecified trigger (Primary)          ED Disposition Condition    Discharge Stable          ED Prescriptions       Medication Sig Dispense Start Date End Date Auth. Provider    cetirizine (ZYRTEC) 10 MG tablet Take 1 tablet (10 mg total) by mouth once daily. for 7 days 7 tablet 7/11/2025 7/18/2025 Nahun Zimmer PA-C          Follow-up Information       Follow up With Specialties Details Why Contact Info [facility] Emergency Medicine  If symptoms worsen  [address] [facility] [address] [phone]                   [1]   Social History  Tobacco Use    Smoking status: Some Days     Current packs/day: 0.00     Average packs/day: 0.3 packs/day for 1.5 years (0.4 ttl pk-yrs)     Types: Cigarettes     Start date: 2019     Last attempt to quit:      Years since quittin.5    Smokeless tobacco: Never   Substance Use Topics    Alcohol use: Yes     Alcohol/week: 2.0 standard drinks of alcohol     Types: 2 Cans of beer per week     Comment: only for football    Drug use: Never        Nahun Zimmer PA-C  25 0217

## 2025-07-15 ENCOUNTER — PATIENT OUTREACH (OUTPATIENT)
Facility: OTHER | Age: 50
End: 2025-07-15
Payer: MEDICAID

## 2025-07-15 NOTE — PROGRESS NOTES
Mr Sidhu stated his rash has not improved since his ER visit on 7/11/25. Mr Sidhu requested assistance in reaching out to his PCP Dr Gurrola at [facility] for a follow up. I called [phone] and spoke with Alba and a follow up is scheduled for 7/16/25 at 10:30 am.    Mr Sidhu agreed to follow up date and time with Dr Gurrola at [facility]. ED navigator plans to follow-up with patient on/around 8/13/25.

## 2025-07-23 ENCOUNTER — TELEPHONE (OUTPATIENT)
Dept: SMOKING CESSATION | Facility: CLINIC | Age: 50
End: 2025-07-23
Payer: MEDICAID

## 2025-08-19 ENCOUNTER — TELEPHONE (OUTPATIENT)
Dept: SMOKING CESSATION | Facility: CLINIC | Age: 50
End: 2025-08-19
Payer: MEDICAID

## (undated) DEVICE — KIT SURGICAL TURNOVER

## (undated) DEVICE — GLOVE SIGNATURE MICRO LTX 6.5

## (undated) DEVICE — SYR 10CC LUER LOCK

## (undated) DEVICE — NDL HYPO REG 25G X 1 1/2

## (undated) DEVICE — GLOVE SENSICARE PI GRN 7

## (undated) DEVICE — SOL 9P NACL IRR PIC IL

## (undated) DEVICE — SUT PROLENE 0 MO6 30IN BLUE

## (undated) DEVICE — HANDLE DEVON RIGID OR LIGHT

## (undated) DEVICE — GOWN POLY REINF BRTH SLV XL

## (undated) DEVICE — SUT 3-0 VICRYL / SH (J416)

## (undated) DEVICE — SUT MCRYL PLUS 4-0 PS2 27IN

## (undated) DEVICE — MANIFOLD 4 PORT

## (undated) DEVICE — GLOVE SENSICARE PI GRN 7.5

## (undated) DEVICE — SUT 2/0 30IN SILK BLK BRAI

## (undated) DEVICE — GLOVE SIGNATURE MICRO LTX 7

## (undated) DEVICE — SUT 2-0 VICRYL / SH (J417)

## (undated) DEVICE — YANKAUER FLEX NO VENT REG CAP

## (undated) DEVICE — GLOVE SIGNATURE ESSNTL LTX 6

## (undated) DEVICE — SUT SA85H SILK 2-0

## (undated) DEVICE — DRAIN PENRS STERILE LTX 18X1/2

## (undated) DEVICE — KIT SURGICAL COLON .25 1.1OZ

## (undated) DEVICE — APPLICATOR CHLORAPREP ORN 26ML

## (undated) DEVICE — SPONGE KITTNER 1/4X 5/8 L STRL

## (undated) DEVICE — Device

## (undated) DEVICE — ADHESIVE DERMABOND ADVANCED

## (undated) DEVICE — SUT PDSII 4-0 PS-2 CLEAR MO

## (undated) DEVICE — SUT 3/0 27IN PDS II VIO MO